# Patient Record
Sex: FEMALE | Race: WHITE | Employment: UNEMPLOYED | URBAN - METROPOLITAN AREA
[De-identification: names, ages, dates, MRNs, and addresses within clinical notes are randomized per-mention and may not be internally consistent; named-entity substitution may affect disease eponyms.]

---

## 2018-07-25 ENCOUNTER — HOSPITAL ENCOUNTER (OUTPATIENT)
Dept: OTHER | Age: 38
Discharge: HOME | End: 2018-07-25
Attending: SURGERY | Admitting: CLINIC/CENTER

## 2018-07-29 LAB
CULTURE: NORMAL
Lab: NORMAL
REPORT STATUS: NORMAL
SPECIMEN: NORMAL

## 2018-10-29 ENCOUNTER — HOSPITAL ENCOUNTER (OUTPATIENT)
Age: 38
Setting detail: SPECIMEN
Discharge: HOME | End: 2018-10-29
Payer: COMMERCIAL

## 2018-10-29 PROCEDURE — 87070 CULTURE OTHR SPECIMN AEROBIC: CPT

## 2018-11-01 LAB
CULTURE: NORMAL
Lab: NORMAL
REPORT STATUS: NORMAL
SPECIMEN: NORMAL

## 2019-02-26 ENCOUNTER — HOSPITAL ENCOUNTER (OUTPATIENT)
Age: 39
Discharge: HOME | End: 2019-02-26
Payer: COMMERCIAL

## 2019-02-26 LAB
ALBUMIN SERPL-MCNC: 4.4 GM/DL (ref 3.4–5)
ALP BLD-CCNC: 63 IU/L (ref 40–129)
ALT SERPL-CCNC: 17 U/L (ref 10–40)
AST SERPL-CCNC: 15 IU/L (ref 15–37)
BILIRUB SERPL-MCNC: 0.2 MG/DL (ref 0–1)
BILIRUBIN DIRECT: 0.2 MG/DL (ref 0–0.3)
BILIRUBIN, INDIRECT: 0 MG/DL (ref 0–0.7)
HEPATITIS B SURFACE ANTIGEN: NON REACTIVE
HEPATITIS C ANTIBODY: NON REACTIVE
TOTAL PROTEIN: 6.8 GM/DL (ref 6.4–8.2)

## 2019-02-26 PROCEDURE — 87389 HIV-1 AG W/HIV-1&-2 AB AG IA: CPT

## 2019-02-26 PROCEDURE — 80076 HEPATIC FUNCTION PANEL: CPT

## 2019-02-26 PROCEDURE — 87340 HEPATITIS B SURFACE AG IA: CPT

## 2019-02-26 PROCEDURE — 86803 HEPATITIS C AB TEST: CPT

## 2019-02-26 PROCEDURE — 36415 COLL VENOUS BLD VENIPUNCTURE: CPT

## 2019-02-28 ENCOUNTER — TELEPHONE (OUTPATIENT)
Dept: GASTROENTEROLOGY | Age: 39
End: 2019-02-28

## 2019-02-28 LAB — HIV SCREEN: NON REACTIVE

## 2020-06-30 ENCOUNTER — HOSPITAL ENCOUNTER (EMERGENCY)
Age: 40
Discharge: LEFT BEFORE DISCHARGE | End: 2020-06-30
Attending: EMERGENCY MEDICINE
Payer: COMMERCIAL

## 2020-06-30 VITALS
DIASTOLIC BLOOD PRESSURE: 84 MMHG | HEIGHT: 71 IN | HEART RATE: 67 BPM | BODY MASS INDEX: 15.4 KG/M2 | WEIGHT: 110 LBS | TEMPERATURE: 98.1 F | OXYGEN SATURATION: 100 % | RESPIRATION RATE: 19 BRPM | SYSTOLIC BLOOD PRESSURE: 110 MMHG

## 2020-06-30 PROCEDURE — 99283 EMERGENCY DEPT VISIT LOW MDM: CPT

## 2020-06-30 ASSESSMENT — PAIN DESCRIPTION - LOCATION: LOCATION: ABDOMEN;MOUTH

## 2020-06-30 ASSESSMENT — PAIN SCALES - GENERAL: PAINLEVEL_OUTOF10: 10

## 2020-06-30 NOTE — ED PROVIDER NOTES
As physician-in-triage, I performed a medical screening history and physical exam on this patient. HISTORY OF PRESENT ILLNESS  Carol Lombardo is a 47 y.o. female states toothache a few days ago, went to dentist and has an abscess. States that they will not pull the tooth. States she is had nausea and vomiting for several days. States she is concerned she is dehydrated. PHYSICAL EXAM  There were no vitals taken for this visit. On exam, the patient appears well-hydrated, well-nourished, and in no acute distress. Mucous membranes are moist. Speech is clear. Breathing is unlabored. Skin is dry. Mental status is normal. The patient moves all extremities, and is without facial droop. Comment: Please note this report has been produced using speech recognition software and may contain errors related to that system including errors in grammar, punctuation, and spelling, as well as words and phrases that may be inappropriate. If there are any questions or concerns please feel free to contact the dictating provider for clarification.       Alvin Pinto,   06/30/20 [id number]

## 2020-06-30 NOTE — ED TRIAGE NOTES
Pt is a recovering addict, sober for 2 years, does not want any narcotic medication. Pt has a dental abscess that is causing severe pain, nausea, and vomiting. Pt states that she is vomiting blood, none noted during triage. Pt states that she needs an IV and that she is dehydrated.

## 2020-07-01 NOTE — ED PROVIDER NOTES
Patient eloped. I did not see or evaluate this patient.     Electronically signed by Robert Montes MD on 6/30/2020 at 9:00 PM        Robert Montes MD  07/03/20 [id number] Adria Noble MD  07/03/20 [id number]

## 2022-10-10 ENCOUNTER — TELEPHONE (OUTPATIENT)
Dept: FAMILY MEDICINE CLINIC | Age: 42
End: 2022-10-10

## 2022-10-10 NOTE — TELEPHONE ENCOUNTER
Made appointment for patient. Mother called in for her to be a new patient. Had been a patient here years ago.

## 2022-10-27 ENCOUNTER — OFFICE VISIT (OUTPATIENT)
Dept: FAMILY MEDICINE CLINIC | Age: 42
End: 2022-10-27
Payer: MEDICARE

## 2022-10-27 ENCOUNTER — TELEPHONE (OUTPATIENT)
Dept: FAMILY MEDICINE CLINIC | Age: 42
End: 2022-10-27

## 2022-10-27 VITALS
HEART RATE: 72 BPM | SYSTOLIC BLOOD PRESSURE: 92 MMHG | HEIGHT: 71 IN | WEIGHT: 97 LBS | BODY MASS INDEX: 13.58 KG/M2 | DIASTOLIC BLOOD PRESSURE: 56 MMHG | TEMPERATURE: 98.1 F | OXYGEN SATURATION: 95 %

## 2022-10-27 DIAGNOSIS — Z00.00 ENCOUNTER FOR MEDICAL EXAMINATION TO ESTABLISH CARE: ICD-10-CM

## 2022-10-27 DIAGNOSIS — J30.89 ALLERGIC RHINITIS DUE TO FUNGAL SPORES, UNSPECIFIED SEASONALITY: ICD-10-CM

## 2022-10-27 DIAGNOSIS — K21.9 GASTROESOPHAGEAL REFLUX DISEASE, UNSPECIFIED WHETHER ESOPHAGITIS PRESENT: ICD-10-CM

## 2022-10-27 DIAGNOSIS — Z00.00 ENCOUNTER FOR MEDICAL EXAMINATION TO ESTABLISH CARE: Primary | ICD-10-CM

## 2022-10-27 DIAGNOSIS — R63.6 UNDERWEIGHT: ICD-10-CM

## 2022-10-27 DIAGNOSIS — F17.210 CIGARETTE SMOKER: ICD-10-CM

## 2022-10-27 DIAGNOSIS — F41.9 ANXIETY DISORDER, UNSPECIFIED TYPE: ICD-10-CM

## 2022-10-27 DIAGNOSIS — Z13.220 SCREENING CHOLESTEROL LEVEL: ICD-10-CM

## 2022-10-27 DIAGNOSIS — J01.40 ACUTE PANSINUSITIS, RECURRENCE NOT SPECIFIED: ICD-10-CM

## 2022-10-27 DIAGNOSIS — K59.00 CONSTIPATION, UNSPECIFIED CONSTIPATION TYPE: ICD-10-CM

## 2022-10-27 DIAGNOSIS — R68.81 EARLY SATIETY: ICD-10-CM

## 2022-10-27 PROBLEM — F32.A ANXIETY AND DEPRESSION: Status: ACTIVE | Noted: 2022-10-27

## 2022-10-27 PROCEDURE — 99205 OFFICE O/P NEW HI 60 MIN: CPT

## 2022-10-27 RX ORDER — ALBUTEROL SULFATE 90 UG/1
2 AEROSOL, METERED RESPIRATORY (INHALATION) 4 TIMES DAILY PRN
Qty: 54 G | Refills: 1 | Status: SHIPPED | OUTPATIENT
Start: 2022-10-27

## 2022-10-27 RX ORDER — BUPRENORPHINE HYDROCHLORIDE 8 MG/1
TABLET SUBLINGUAL
COMMUNITY
Start: 2022-10-21

## 2022-10-27 RX ORDER — PANTOPRAZOLE SODIUM 40 MG/1
40 TABLET, DELAYED RELEASE ORAL
Qty: 30 TABLET | Refills: 5 | Status: SHIPPED
Start: 2022-10-27 | End: 2022-11-23 | Stop reason: SINTOL

## 2022-10-27 RX ORDER — DOXYCYCLINE HYCLATE 100 MG
100 TABLET ORAL 2 TIMES DAILY
Qty: 20 TABLET | Refills: 0 | Status: SHIPPED | OUTPATIENT
Start: 2022-10-27 | End: 2022-11-06

## 2022-10-27 RX ORDER — LORATADINE 10 MG/1
10 TABLET ORAL DAILY
Qty: 30 TABLET | Refills: 3 | Status: SHIPPED | OUTPATIENT
Start: 2022-10-27 | End: 2022-11-23 | Stop reason: SDUPTHER

## 2022-10-27 RX ORDER — METHYLPREDNISOLONE 4 MG/1
TABLET ORAL
Qty: 1 KIT | Refills: 0 | Status: SHIPPED | OUTPATIENT
Start: 2022-10-27 | End: 2022-11-03 | Stop reason: SDUPTHER

## 2022-10-27 RX ORDER — FLUTICASONE PROPIONATE 50 MCG
2 SPRAY, SUSPENSION (ML) NASAL DAILY
Qty: 16 G | Refills: 0 | Status: SHIPPED | OUTPATIENT
Start: 2022-10-27 | End: 2022-11-23 | Stop reason: SDUPTHER

## 2022-10-27 SDOH — ECONOMIC STABILITY: FOOD INSECURITY: WITHIN THE PAST 12 MONTHS, YOU WORRIED THAT YOUR FOOD WOULD RUN OUT BEFORE YOU GOT MONEY TO BUY MORE.: NEVER TRUE

## 2022-10-27 SDOH — ECONOMIC STABILITY: FOOD INSECURITY: WITHIN THE PAST 12 MONTHS, THE FOOD YOU BOUGHT JUST DIDN'T LAST AND YOU DIDN'T HAVE MONEY TO GET MORE.: NEVER TRUE

## 2022-10-27 ASSESSMENT — ENCOUNTER SYMPTOMS
NAUSEA: 0
ABDOMINAL PAIN: 1
SINUS PAIN: 1
COUGH: 1
ABDOMINAL DISTENTION: 0
DIARRHEA: 0
SINUS PRESSURE: 1
RHINORRHEA: 1
SHORTNESS OF BREATH: 1
COLOR CHANGE: 0
VOMITING: 0
BLOOD IN STOOL: 0
WHEEZING: 0
CONSTIPATION: 0

## 2022-10-27 ASSESSMENT — PATIENT HEALTH QUESTIONNAIRE - PHQ9
SUM OF ALL RESPONSES TO PHQ9 QUESTIONS 1 & 2: 3
7. TROUBLE CONCENTRATING ON THINGS, SUCH AS READING THE NEWSPAPER OR WATCHING TELEVISION: 3
5. POOR APPETITE OR OVEREATING: 3
8. MOVING OR SPEAKING SO SLOWLY THAT OTHER PEOPLE COULD HAVE NOTICED. OR THE OPPOSITE, BEING SO FIGETY OR RESTLESS THAT YOU HAVE BEEN MOVING AROUND A LOT MORE THAN USUAL: 0
6. FEELING BAD ABOUT YOURSELF - OR THAT YOU ARE A FAILURE OR HAVE LET YOURSELF OR YOUR FAMILY DOWN: 3
9. THOUGHTS THAT YOU WOULD BE BETTER OFF DEAD, OR OF HURTING YOURSELF: 0
3. TROUBLE FALLING OR STAYING ASLEEP: 3
1. LITTLE INTEREST OR PLEASURE IN DOING THINGS: 0
4. FEELING TIRED OR HAVING LITTLE ENERGY: 3
SUM OF ALL RESPONSES TO PHQ QUESTIONS 1-9: 18
SUM OF ALL RESPONSES TO PHQ QUESTIONS 1-9: 18
2. FEELING DOWN, DEPRESSED OR HOPELESS: 3
SUM OF ALL RESPONSES TO PHQ QUESTIONS 1-9: 18
SUM OF ALL RESPONSES TO PHQ QUESTIONS 1-9: 18
10. IF YOU CHECKED OFF ANY PROBLEMS, HOW DIFFICULT HAVE THESE PROBLEMS MADE IT FOR YOU TO DO YOUR WORK, TAKE CARE OF THINGS AT HOME, OR GET ALONG WITH OTHER PEOPLE: 3

## 2022-10-27 ASSESSMENT — SOCIAL DETERMINANTS OF HEALTH (SDOH): HOW HARD IS IT FOR YOU TO PAY FOR THE VERY BASICS LIKE FOOD, HOUSING, MEDICAL CARE, AND HEATING?: SOMEWHAT HARD

## 2022-10-27 NOTE — PROGRESS NOTES
10/27/2022    [address] [place] [address]    Chief Complaint   Patient presents with    New Patient     - sinus pain & pressure, nasal congestion, left ear pain, voice hoarse, productive cough, chest congestion, chest hurts. 3 - 4 months. Was seen [facility]. Pt bought a house 2 years ago - concern their might be mold. Has not seen mold but is scheduled to have someone come and test for mold. Pt has noticed she feels better when she leaves the house within 1 hour post  coming home she feels as if she has a headache, difficulty breathing. Pt was unable to take azithromycin d/t nausea & vom       HPI  History was obtained from patient. Tru Aguilera is a pleasant 38 y.o. female who presents today to establish care. She has not had a primary care provider in several years. She has three biological and one adopted child. Her biological children are also sick but her adopted son is not. Her biological children have had various viral infections within the last month. The roof of a home that she bought to fix up and move into was worked on and they found some mold- she has not had any additional mold found but she has not opened walls- she is working with a [employer] for possible inspection and remediation- she has gotten approval for roof replacement and mold inspection/remediation but they have to send someone out and she has not been notified of when that will take place. She is not currently living in the home- they are working on remodeling. Currently she lives with her mother who is looking to downsize and move herself in the coming months. Since beginning to work on this home she has noticed headaches that started in June. She has roughly 4 headaches a week at this point. Headaches always originate from her frontal sinus area and then slowly spread over the rest of her head. She denies vision changes/hearing changes during these headaches.  She notes increased sinus pain and pressure, nasal congestion and left ear pain. She has SOB that is worse with exertion, she is a current smoker. She is losing her hair and has a very difficult time sleeping. Sinus symptoms have been present since July- she was previously seen for this but was unable to tolerate Azithromycin. Several negative Covid-19 tests. Her sister passed away unexpectedly in May of this year. She endorses difficulty dealing with this and her addiction specialist has tried several medications to both help with weight gain as well as anxiety/depression without much success. She follows with Dr. Rhea Mullen for suboxone. Has been clean and sober for over 4 years. She is underweight and struggles to finish her meals. She states that she wants to eat and when she sits down to eat that snf through the meal she begins to feel sick and is not even able to swallow another bite of food. She denies difficulty swallowing in general.  She does struggle with constipation but takes MiraLAX on a regular basis. Endorses type I Joshua stool chart BMs. Denies blood in stool/black stools. States that she has a history of gastroparesis. She eats usually one larger meal per day- she has tried to purchase Boost/Ensure but this is expensive for her. 1. Encounter for medical examination to establish care    2. Acute pansinusitis, recurrence not specified    3. Allergic rhinitis due to fungal spores, unspecified seasonality    4. Anxiety and depression    5. Underweight    6. Early satiety    7. Cigarette smoker    8. Screening cholesterol level    9. Constipation, unspecified constipation type    10. Anxiety disorder, unspecified type              REVIEW OF SYMPTOMS    Review of Systems   Constitutional:  Positive for fatigue. Negative for activity change, appetite change, chills, fever and unexpected weight change. HENT:  Positive for postnasal drip, rhinorrhea, sinus pressure and sinus pain.     Respiratory:  Positive for cough and shortness of breath. Negative for wheezing. Cardiovascular:  Negative for chest pain and palpitations. Gastrointestinal:  Positive for abdominal pain. Negative for abdominal distention, blood in stool, constipation, diarrhea, nausea and vomiting. Skin:  Negative for color change. Neurological:  Negative for syncope and headaches. PAST MEDICAL HISTORY  Past Medical History:   Diagnosis Date    Anxiety     Arrhythmia     AV block, 2nd degree     mobitz type 1    Back injury     Bradycardia     Depression     Gastroparesis     GERD (gastroesophageal reflux disease)     H/O 24 hour EKG monitoring 6/11    baseline rhythm is sinus amina  average 50bpm , episodes of wenchebach phenomenon with brief runs of sinus tachycardia. H/O complete electrocardiogram 6/23/11         Hx of echocardiogram 6/23/11    normal size lv trace tricuspid regurg otherwise normal .    Scoliosis     SOB (shortness of breath)        FAMILY HISTORY  Family History   Problem Relation Age of Onset    Stroke Mother     High Cholesterol Mother     Mult Sclerosis Mother     Crohn's Disease Sister     Bleeding Prob Brother        SOCIAL HISTORY  Social History     Socioeconomic History    Marital status:      Spouse name: None    Number of children: None    Years of education: None    Highest education level: None   Tobacco Use    Smoking status: Every Day     Packs/day: 0.50     Years: 30.00     Pack years: 15.00     Types: Cigarettes     Start date: 1992    Smokeless tobacco: Never    Tobacco comments:     counseled to quit smoking. 2/5/2013   Substance and Sexual Activity    Alcohol use: No    Drug use: Yes     Types: Marijuana Crispin Mccartney)     Comment: history addiction percocet    Sexual activity: Yes     Partners: Male     Social Determinants of Health     Financial Resource Strain: Medium Risk    Difficulty of Paying Living Expenses: Somewhat hard   Food Insecurity: No Food Insecurity    Worried About Running Out of Food in the Last Year: Never true    Ran Out of Food in the Last Year: Never true        SURGICAL HISTORY  Past Surgical History:   Procedure Laterality Date    APPENDECTOMY      BACK SURGERY      scoliosis    BREAST LUMPECTOMY      R breast    CHOLECYSTECTOMY      HYSTERECTOMY (CERVIX STATUS UNKNOWN)  11/10     OVARY REMOVAL  11/10    TONSILLECTOMY      TUBAL LIGATION                   CURRENT MEDICATIONS  Current Outpatient Medications   Medication Sig Dispense Refill    buprenorphine (SUBUTEX) 8 MG SUBL SL tablet       doxycycline hyclate (VIBRA-TABS) 100 MG tablet Take 1 tablet by mouth 2 times daily for 10 days 20 tablet 0    methylPREDNISolone (MEDROL DOSEPACK) 4 MG tablet Take by mouth. 1 kit 0    albuterol sulfate HFA (VENTOLIN HFA) 108 (90 Base) MCG/ACT inhaler Inhale 2 puffs into the lungs 4 times daily as needed for Wheezing 54 g 1    loratadine (CLARITIN) 10 MG tablet Take 1 tablet by mouth daily 30 tablet 3    fluticasone (FLONASE) 50 MCG/ACT nasal spray 2 sprays by Each Nostril route daily 16 g 0    pantoprazole (PROTONIX) 40 MG tablet Take 1 tablet by mouth every morning (before breakfast) 30 tablet 5    dicyclomine (BENTYL) 10 MG capsule Take 1 capsule by mouth 3 times daily As needed for abdominal pain 15 capsule 0    ALBUTEROL SULFATE HFA IN Inhale into the lungs       No current facility-administered medications for this visit. ALLERGIES  Allergies   Allergen Reactions    Amoxicillin Hives and Shortness Of Breath    Pcn [Penicillins] Hives and Shortness Of Breath    Sulfa Antibiotics     Meloxicam Nausea And Vomiting       PHYSICAL EXAM    BP (!) 92/56   Pulse 72   Temp 98.1 °F (36.7 °C)   Ht 5' 11\" (1.803 m)   Wt 97 lb (44 kg)   SpO2 95%   BMI 13.53 kg/m²     Physical Exam  Vitals and nursing note reviewed. Constitutional:       General: She is not in acute distress. Appearance: Normal appearance. She is well-developed. HENT:      Head: Normocephalic and atraumatic.       Nose:      Right Sinus: Frontal sinus tenderness present. Left Sinus: Frontal sinus tenderness present. Eyes:      General: No scleral icterus. Conjunctiva/sclera: Conjunctivae normal.   Neck:      Thyroid: No thyromegaly. Trachea: No tracheal deviation. Cardiovascular:      Rate and Rhythm: Normal rate and regular rhythm. Heart sounds: Normal heart sounds. No murmur heard. No friction rub. No gallop. Pulmonary:      Effort: Pulmonary effort is normal. No respiratory distress. Breath sounds: Normal breath sounds. No wheezing or rales. Abdominal:      General: Bowel sounds are normal. There is no distension. Palpations: Abdomen is soft. There is no hepatomegaly or mass. Tenderness: There is no abdominal tenderness. There is no guarding or rebound. Musculoskeletal:         General: No swelling or deformity. Normal range of motion. Cervical back: Neck supple. Lymphadenopathy:      Cervical: No cervical adenopathy. Skin:     General: Skin is warm and dry. Nails: There is no clubbing. Neurological:      Mental Status: She is alert and oriented to person, place, and time. Mental status is at baseline. Psychiatric:         Mood and Affect: Mood normal.         Behavior: Behavior normal.         Thought Content: Thought content normal.         Judgment: Judgment normal.       ASSESSMENT & PLAN    Herminio Arthur was seen today for new patient. Diagnoses and all orders for this visit:    Encounter for medical examination to establish care  -     TSH with Reflex to FT4; Future  -     Comprehensive Metabolic Panel; Future  -     CBC with Auto Differential; Future  -     Ambulatory referral to Obstetrics / Gynecology    Acute pansinusitis, recurrence not specified  Symptoms consistent with sinusitis, will treat and consider ENT referral if symptoms persist- as her drug abuse history was inhalation and not IV in nature.  Will also provide steroid related to history of smoking.   - doxycycline hyclate (VIBRA-TABS) 100 MG tablet; Take 1 tablet by mouth 2 times daily for 10 days  -     methylPREDNISolone (MEDROL DOSEPACK) 4 MG tablet; Take by mouth. Allergic rhinitis due to fungal spores, unspecified seasonality  Symptoms increase when she goes to the new home to work- she is wearing a mask for all work there. Will treat for allergic response.   -     loratadine (CLARITIN) 10 MG tablet; Take 1 tablet by mouth daily  -     fluticasone (FLONASE) 50 MCG/ACT nasal spray; 2 sprays by Each Nostril route daily    Underweight  Will check for malabsorption, thyroid function, anemia. Will refer to care coordination for possible insurance coverage of meal supplement shakes. Reaching out to Dr. Kaya Ortiz for list of previous medications that he prescribed to pt for weight gain to determine which choice would be best to try next.   -     TSH with Reflex to FT4; Future  -     Comprehensive Metabolic Panel; Future  -     CBC with Auto Differential; Future  -     Vitamin B12 & Folate; Future  -     Magnesium; Future    Early satiety  Unable to finish an entire meal, considerably underweight. -     Kortney Irving CNP, Gastroenterology, [facility]    Cigarette smoker  Increased SOB with work in the new home- possible allergies versus chronic changes from smoking history. Will trial inhaler and assess efficacy. -     albuterol sulfate HFA (VENTOLIN HFA) 108 (90 Base) MCG/ACT inhaler; Inhale 2 puffs into the lungs 4 times daily as needed for Wheezing    Screening cholesterol level  Will update labs and ASCVD risk score. -     Lipid, Fasting; Future    Constipation, unspecified constipation type  Taking Miralax daily but still having constipation- could be a factor in weight loss.    -     Kortney Irving CNP, Gastroenterology, [facility]    Anxiety disorder, unspecified type   -     TSH with Reflex to FT4; Future    Gastroesophageal reflux disease, unspecified whether esophagitis present        -     pantoprazole (PROTONIX) 40 MG tablet; Take 1 tablet by mouth every morning (before breakfast)    Return in about 4 weeks (around 11/24/2022). On this date 10/27/22 I have spent 68 minutes reviewing previous notes, test results and face to face with the patient discussing the diagnosis and importance of compliance with the treatment plan as well as documenting on the day of the visit.     Electronically signed by CAROLE Beckwith CNP on 10/27/2022

## 2022-10-27 NOTE — TELEPHONE ENCOUNTER
Spoke with Valerie with Dr Mary Anne Ivy office  [phone] requesting record of meds tried for appetite , depression and anxiety.  Valerie stated she will send info

## 2022-10-27 NOTE — PATIENT INSTRUCTIONS
[facility] Obstetrics and Gynecology - Yang DiannChaka harper  Adamaris Schulte [address]  Phone: [phone] Gastroenterology - Mary Sp[address], [address] [facility]  Sof[address] [phone]    Care Coordinator Miya Denis RN

## 2022-10-28 ENCOUNTER — TELEPHONE (OUTPATIENT)
Dept: GASTROENTEROLOGY | Age: 42
End: 2022-10-28

## 2022-10-28 LAB
A/G RATIO: 1.8 (ref 1.1–2.2)
ALBUMIN SERPL-MCNC: 4.6 G/DL (ref 3.4–5)
ALP BLD-CCNC: 73 U/L (ref 40–129)
ALT SERPL-CCNC: 15 U/L (ref 10–40)
ANION GAP SERPL CALCULATED.3IONS-SCNC: 10 MMOL/L (ref 3–16)
AST SERPL-CCNC: 16 U/L (ref 15–37)
BASOPHILS ABSOLUTE: 0.1 K/UL (ref 0–0.2)
BASOPHILS RELATIVE PERCENT: 1.2 %
BILIRUB SERPL-MCNC: <0.2 MG/DL (ref 0–1)
BUN BLDV-MCNC: 9 MG/DL (ref 7–20)
CALCIUM SERPL-MCNC: 9.7 MG/DL (ref 8.3–10.6)
CHLORIDE BLD-SCNC: 100 MMOL/L (ref 99–110)
CHOLESTEROL, FASTING: 159 MG/DL (ref 0–199)
CO2: 30 MMOL/L (ref 21–32)
CREAT SERPL-MCNC: 0.6 MG/DL (ref 0.6–1.1)
EOSINOPHILS ABSOLUTE: 0.3 K/UL (ref 0–0.6)
EOSINOPHILS RELATIVE PERCENT: 7.3 %
FOLATE: 6.15 NG/ML (ref 4.78–24.2)
GFR SERPL CREATININE-BSD FRML MDRD: >60 ML/MIN/{1.73_M2}
GLUCOSE BLD-MCNC: 90 MG/DL (ref 70–99)
HCT VFR BLD CALC: 38.5 % (ref 36–48)
HDLC SERPL-MCNC: 45 MG/DL (ref 40–60)
HEMOGLOBIN: 13.5 G/DL (ref 12–16)
LDL CHOLESTEROL CALCULATED: 97 MG/DL
LYMPHOCYTES ABSOLUTE: 1.1 K/UL (ref 1–5.1)
LYMPHOCYTES RELATIVE PERCENT: 27.5 %
MAGNESIUM: 2.1 MG/DL (ref 1.8–2.4)
MCH RBC QN AUTO: 31.9 PG (ref 26–34)
MCHC RBC AUTO-ENTMCNC: 35 G/DL (ref 31–36)
MCV RBC AUTO: 91.3 FL (ref 80–100)
MONOCYTES ABSOLUTE: 0.7 K/UL (ref 0–1.3)
MONOCYTES RELATIVE PERCENT: 18.4 %
NEUTROPHILS ABSOLUTE: 1.8 K/UL (ref 1.7–7.7)
NEUTROPHILS RELATIVE PERCENT: 45.6 %
PDW BLD-RTO: 12.8 % (ref 12.4–15.4)
PLATELET # BLD: 217 K/UL (ref 135–450)
PMV BLD AUTO: 9.5 FL (ref 5–10.5)
POTASSIUM SERPL-SCNC: 4 MMOL/L (ref 3.5–5.1)
RBC # BLD: 4.22 M/UL (ref 4–5.2)
SODIUM BLD-SCNC: 140 MMOL/L (ref 136–145)
TOTAL PROTEIN: 7.1 G/DL (ref 6.4–8.2)
TRIGLYCERIDE, FASTING: 85 MG/DL (ref 0–150)
TSH REFLEX FT4: 0.83 UIU/ML (ref 0.27–4.2)
VITAMIN B-12: 1038 PG/ML (ref 211–911)
VLDLC SERPL CALC-MCNC: 17 MG/DL
WBC # BLD: 4 K/UL (ref 4–11)

## 2022-10-31 ENCOUNTER — TELEPHONE (OUTPATIENT)
Dept: FAMILY MEDICINE CLINIC | Age: 42
End: 2022-10-31

## 2022-10-31 NOTE — TELEPHONE ENCOUNTER
Fyi also to [facility]. Spoke with pt per provider note. Pt agreed & voiced understanding. Pt states she is feeling better still waiting to hear from gi. Agrees to start abilify. Informed pt to call us if she is not improving. Pt agreed and voiced understanding.

## 2022-10-31 NOTE — TELEPHONE ENCOUNTER
Dr Ana Tamayo requested that you review her 10/27/22 office note & Dr Jeramie Farooq (addiction specialist) office (his note is in media). To see which meds would be appropriate to start for anxiety & depression. Dr Claudean Shield did recently rx Abilify 2 mg 1 qd. Pt did not start med yet d/t waiting to hear what our office suggests. Informed pt to please give you a couple days to answer this message.     Thanks

## 2022-10-31 NOTE — TELEPHONE ENCOUNTER
Spoke with Valerie again with Dr Isidro Young office  [phone]. Informed did not receive faxed info re past depression & anxiety meds pt has tried. Valerie stated she will refax to our office.

## 2022-11-03 ENCOUNTER — TELEPHONE (OUTPATIENT)
Dept: FAMILY MEDICINE CLINIC | Age: 42
End: 2022-11-03

## 2022-11-03 DIAGNOSIS — J01.40 ACUTE PANSINUSITIS, RECURRENCE NOT SPECIFIED: ICD-10-CM

## 2022-11-03 RX ORDER — METHYLPREDNISOLONE 4 MG/1
TABLET ORAL
Qty: 1 KIT | Refills: 0 | Status: SHIPPED | OUTPATIENT
Start: 2022-11-03 | End: 2022-11-23 | Stop reason: ALTCHOICE

## 2022-11-04 ENCOUNTER — CARE COORDINATION (OUTPATIENT)
Dept: CARE COORDINATION | Age: 42
End: 2022-11-04

## 2022-11-04 ENCOUNTER — TELEPHONE (OUTPATIENT)
Dept: GASTROENTEROLOGY | Age: 42
End: 2022-11-04

## 2022-11-04 NOTE — TELEPHONE ENCOUNTER
Called pt. In regards to a referral for a constipation and early satiety. Lm for pt to call back to make an appt.

## 2022-11-10 ENCOUNTER — CARE COORDINATION (OUTPATIENT)
Dept: CARE COORDINATION | Age: 42
End: 2022-11-10

## 2022-11-11 ENCOUNTER — TELEPHONE (OUTPATIENT)
Dept: GASTROENTEROLOGY | Age: 42
End: 2022-11-11

## 2022-11-11 NOTE — TELEPHONE ENCOUNTER
Attempted to call patient to schedule an appointment. Unable to reach patient. Left a detailed message requesting a call back.

## 2022-11-14 RX ORDER — ALBUTEROL SULFATE 2.5 MG/3ML
2.5 SOLUTION RESPIRATORY (INHALATION) EVERY 6 HOURS PRN
Qty: 120 EACH | Refills: 0 | Status: SHIPPED | OUTPATIENT
Start: 2022-11-14

## 2022-11-21 ENCOUNTER — CARE COORDINATION (OUTPATIENT)
Dept: CARE COORDINATION | Age: 42
End: 2022-11-21

## 2022-11-23 ENCOUNTER — OFFICE VISIT (OUTPATIENT)
Dept: FAMILY MEDICINE CLINIC | Age: 42
End: 2022-11-23
Payer: MEDICARE

## 2022-11-23 VITALS
WEIGHT: 97.4 LBS | RESPIRATION RATE: 16 BRPM | SYSTOLIC BLOOD PRESSURE: 94 MMHG | DIASTOLIC BLOOD PRESSURE: 60 MMHG | HEIGHT: 71 IN | BODY MASS INDEX: 13.64 KG/M2 | HEART RATE: 85 BPM | OXYGEN SATURATION: 94 %

## 2022-11-23 DIAGNOSIS — J30.89 ALLERGIC RHINITIS DUE TO FUNGAL SPORES, UNSPECIFIED SEASONALITY: ICD-10-CM

## 2022-11-23 DIAGNOSIS — R63.6 UNDERWEIGHT: ICD-10-CM

## 2022-11-23 DIAGNOSIS — K21.9 GASTROESOPHAGEAL REFLUX DISEASE, UNSPECIFIED WHETHER ESOPHAGITIS PRESENT: ICD-10-CM

## 2022-11-23 DIAGNOSIS — F41.9 ANXIETY AND DEPRESSION: Primary | ICD-10-CM

## 2022-11-23 DIAGNOSIS — F32.A ANXIETY AND DEPRESSION: Primary | ICD-10-CM

## 2022-11-23 PROCEDURE — 99214 OFFICE O/P EST MOD 30 MIN: CPT

## 2022-11-23 RX ORDER — MEGESTROL ACETATE 40 MG/1
40 TABLET ORAL DAILY
Qty: 30 TABLET | Refills: 3 | Status: SHIPPED | OUTPATIENT
Start: 2022-11-23

## 2022-11-23 RX ORDER — DULOXETIN HYDROCHLORIDE 20 MG/1
20 CAPSULE, DELAYED RELEASE ORAL DAILY
Qty: 30 CAPSULE | Refills: 3 | Status: SHIPPED | OUTPATIENT
Start: 2022-11-23

## 2022-11-23 RX ORDER — LORATADINE 10 MG/1
10 TABLET ORAL DAILY
Qty: 30 TABLET | Refills: 3 | Status: SHIPPED | OUTPATIENT
Start: 2022-11-23

## 2022-11-23 RX ORDER — OMEPRAZOLE 20 MG/1
20 CAPSULE, DELAYED RELEASE ORAL
Qty: 60 CAPSULE | Refills: 2 | Status: SHIPPED | OUTPATIENT
Start: 2022-11-23

## 2022-11-23 RX ORDER — FLUTICASONE PROPIONATE 50 MCG
2 SPRAY, SUSPENSION (ML) NASAL DAILY
Qty: 16 G | Refills: 0 | Status: SHIPPED | OUTPATIENT
Start: 2022-11-23

## 2022-11-23 RX ORDER — ARIPIPRAZOLE 5 MG/1
5 TABLET ORAL DAILY
COMMUNITY

## 2022-11-23 NOTE — Clinical Note
I spoke with Isael Winston as you have tried to reach out- she apologized- there has been some trouble with her cell and she admits that she has not been great at checking her vm. She asked to have you reach out again in an effort to get her nutritional supplement covered by her insurance.

## 2022-11-23 NOTE — PATIENT INSTRUCTIONS
[address] [facility] [address]  [address] [place] [address] Adamaris Mcfadden, [address]  [phone]  [employer]    [address]  Adamaris Schulte, [address]  Phone: [phone]   Hours: 8am-6pm   www. [email or web address]  [address]  Phone: [phone]  Hours: M, Tues 9am-6pm, W, Thurs 9am-8pm, F 10am-2pm  [email or web address] for [facility] and [address] [facility] [address], [address] [facility]  Phone: [phone]  Hours: M-Thurs 8am-7pm, F 8am-3pm  www. [address] [place] [address] [address], [address] [facility]  Phone: [phone]  Hours: M-F 8:30am -5pm  [email or web address]    [facility] [address] [facility] [address]  Phone: [phone]  [facility] Hotline: [phone]  Hours: M-F 8am-5pm (Hotline 24/7)  [email or web address]    [facility] [employer] [place] [facility])   [address]   [place] [address]  Phone: [phone]  www. [email or web address]    [facility]   [address] [place] [address]  [phone]  [email or web address]. [email or web address]
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
## 2022-11-24 ASSESSMENT — ENCOUNTER SYMPTOMS
ABDOMINAL PAIN: 1
DIARRHEA: 0
NAUSEA: 0
WHEEZING: 0
ABDOMINAL DISTENTION: 0
BLOOD IN STOOL: 0
CONSTIPATION: 0
VOMITING: 0
COUGH: 1
SHORTNESS OF BREATH: 1
COLOR CHANGE: 0

## 2022-11-25 ENCOUNTER — CARE COORDINATION (OUTPATIENT)
Dept: CARE COORDINATION | Age: 42
End: 2022-11-25

## 2022-11-25 SDOH — HEALTH STABILITY: MENTAL HEALTH: HOW OFTEN DO YOU HAVE A DRINK CONTAINING ALCOHOL?: NEVER

## 2022-11-25 SDOH — SOCIAL STABILITY: SOCIAL NETWORK: HOW OFTEN DO YOU GET TOGETHER WITH FRIENDS OR RELATIVES?: TWICE A WEEK

## 2022-11-25 SDOH — HEALTH STABILITY: MENTAL HEALTH
STRESS IS WHEN SOMEONE FEELS TENSE, NERVOUS, ANXIOUS, OR CAN'T SLEEP AT NIGHT BECAUSE THEIR MIND IS TROUBLED. HOW STRESSED ARE YOU?: TO SOME EXTENT

## 2022-11-25 SDOH — ECONOMIC STABILITY: TRANSPORTATION INSECURITY
IN THE PAST 12 MONTHS, HAS THE LACK OF TRANSPORTATION KEPT YOU FROM MEDICAL APPOINTMENTS OR FROM GETTING MEDICATIONS?: NO

## 2022-11-25 SDOH — SOCIAL STABILITY: SOCIAL NETWORK: HOW OFTEN DO YOU ATTEND CHURCH OR RELIGIOUS SERVICES?: 1 TO 4 TIMES PER YEAR

## 2022-11-25 SDOH — ECONOMIC STABILITY: FOOD INSECURITY: WITHIN THE PAST 12 MONTHS, THE FOOD YOU BOUGHT JUST DIDN'T LAST AND YOU DIDN'T HAVE MONEY TO GET MORE.: SOMETIMES TRUE

## 2022-11-25 SDOH — SOCIAL STABILITY: SOCIAL NETWORK
DO YOU BELONG TO ANY CLUBS OR ORGANIZATIONS SUCH AS CHURCH GROUPS UNIONS, FRATERNAL OR ATHLETIC GROUPS, OR SCHOOL GROUPS?: YES

## 2022-11-25 SDOH — SOCIAL STABILITY: SOCIAL NETWORK: ARE YOU MARRIED, WIDOWED, DIVORCED, SEPARATED, NEVER MARRIED, OR LIVING WITH A PARTNER?: DIVORCED

## 2022-11-25 SDOH — HEALTH STABILITY: PHYSICAL HEALTH: ON AVERAGE, HOW MANY DAYS PER WEEK DO YOU ENGAGE IN MODERATE TO STRENUOUS EXERCISE (LIKE A BRISK WALK)?: 2 DAYS

## 2022-11-25 SDOH — ECONOMIC STABILITY: INCOME INSECURITY: HOW HARD IS IT FOR YOU TO PAY FOR THE VERY BASICS LIKE FOOD, HOUSING, MEDICAL CARE, AND HEATING?: SOMEWHAT HARD

## 2022-11-25 SDOH — ECONOMIC STABILITY: TRANSPORTATION INSECURITY
IN THE PAST 12 MONTHS, HAS LACK OF TRANSPORTATION KEPT YOU FROM MEETINGS, WORK, OR FROM GETTING THINGS NEEDED FOR DAILY LIVING?: NO

## 2022-11-25 SDOH — ECONOMIC STABILITY: HOUSING INSECURITY
IN THE LAST 12 MONTHS, WAS THERE A TIME WHEN YOU DID NOT HAVE A STEADY PLACE TO SLEEP OR SLEPT IN A SHELTER (INCLUDING NOW)?: NO

## 2022-11-25 SDOH — SOCIAL STABILITY: SOCIAL NETWORK: HOW OFTEN DO YOU ATTENT MEETINGS OF THE CLUB OR ORGANIZATION YOU BELONG TO?: 1 TO 4 TIMES PER YEAR

## 2022-11-25 SDOH — ECONOMIC STABILITY: FOOD INSECURITY: WITHIN THE PAST 12 MONTHS, YOU WORRIED THAT YOUR FOOD WOULD RUN OUT BEFORE YOU GOT MONEY TO BUY MORE.: SOMETIMES TRUE

## 2022-11-25 SDOH — HEALTH STABILITY: PHYSICAL HEALTH: ON AVERAGE, HOW MANY MINUTES DO YOU ENGAGE IN EXERCISE AT THIS LEVEL?: 10 MIN

## 2022-11-25 SDOH — SOCIAL STABILITY: SOCIAL NETWORK: IN A TYPICAL WEEK, HOW MANY TIMES DO YOU TALK ON THE PHONE WITH FAMILY, FRIENDS, OR NEIGHBORS?: TWICE A WEEK

## 2022-11-25 SDOH — ECONOMIC STABILITY: INCOME INSECURITY: IN THE LAST 12 MONTHS, WAS THERE A TIME WHEN YOU WERE NOT ABLE TO PAY THE MORTGAGE OR RENT ON TIME?: NO

## 2022-11-25 SDOH — HEALTH STABILITY: MENTAL HEALTH: HOW MANY STANDARD DRINKS CONTAINING ALCOHOL DO YOU HAVE ON A TYPICAL DAY?: PATIENT DOES NOT DRINK

## 2022-11-25 SDOH — ECONOMIC STABILITY: HOUSING INSECURITY: IN THE LAST 12 MONTHS, HOW MANY PLACES HAVE YOU LIVED?: 1

## 2022-11-25 NOTE — CARE COORDINATION
Ambulatory Care Coordination Note  11/25/2022    ACC: Luz Maria Dietz, RN      Spoke with patient for ACM follow up:  Provider referral for enrollment to assist with the cost of dietary supplement. Oriented to the role of ACM. Patient consents to ongoing follow up. Patient reports that she is doing ok. Patient reports that she is struggling to attain healthy weight. Reports that she is drinking Boost OTC supplement 3 x day and a children's energy drink. Patient reports that she has multiple stomach issues d/t past history of addict. Current BM! 12.58. Patient reports eating one meal per day. Patient is in recovery and is very engaged to stay on track. Patient reports that she is struggling to cover the cost of her supplements and is seeking insurance support. ACM to provide assistance. Encouraged small frequent meals. Patient reports that she eats one meal at night. Noted that patient was recently started on Megace. Instructed on medication to increase appetite. Discussed RD referral for education to support healthy weight. Medications:  medication reconciliation completed. Patient reports that she manages her own medications and is taking them as directed. Instructed on the importance of compliance with medications as directed. Patient denies any issue with the cost of medications. Reports that she receives an OTC benefit from her insurance Provider. Discussed support/ resource needs:   Patient lives with her four sons. Patient is able to drive. Patient has PIP to help with utilities. Does run out of groceries at times and is interested in support to help supplement groceries. Agreeable to referral for LSW support. Smoking cessation:  Patient is a current every day smoker and reports that she smokes marijuana at night to promote appetite. Instructed on smoking cessation; [facility] support.   Patient reports that she has discussed smoking cessation with Provider and plans to address \" once she gets her weight on track\". Patient reports a history of anxiety. Reports that she was recently started on Abilify and reports that it is \" really helping\". Instructed on counseling/ BH support an patient reports that she has dicussed with Provider. Verbalized her plan to consider this resource if medication alone is not successful. Offered patient enrollment in the Remote Patient Monitoring (RPM) program for in-home monitoring: NA.    Patient denies any questions or concerns at this time. ACM contact information provided should needs arise. Plan for next outreach:  Confirm support follow up. Referral made to RD with request for education/ support. Referral made to LSW to address support needs. Message sent to [employer]/ RONDA CM in r/t dietary supplement coverage. Ambulatory Care Coordination Assessment    Care Coordination Protocol  Referral from Primary Care Provider: Yes  Week 1 - Initial Assessment     Do you have all of your prescriptions and are they filled?: Yes  Barriers to medication adherence: None  Are you able to afford your medications?: Yes  How often do you have trouble taking your medications the way you have been told to take them?: I always take them as prescribed. Do you have Home O2 Therapy?: No      Ability to seek help/take action for Emergent Urgent situations i.e. fire, crime, inclement weather or health crisis. : Independent  Ability to ambulate to restroom: Independent  Ability handle personal hygeine needs (bathing/dressing/grooming): Independent  Ability to manage Medications: Independent  Ability to prepare Food Preparation: Independent  Ability to maintain home (clean home, laundry): Independent  Ability to drive and/or has transportation: Independent  Ability to do shopping: Independent  Ability to manage finances:  Independent  Is patient able to live independently?: Yes     Current Housing: Private Residence  For whom are you the caregiver?: children  Does the person that you care for see a [facility] PCP?: No        Per the Fall Risk Screening, did the patient have 2 or more falls or 1 fall with injury in the past year?: No     Frequent urination at night?: No  Do you use rails/bars?: No  Do you have a non-slip tub mat?: Yes        Thinking about your patient's physical health needs, are there any symptoms or problems (risk indicators) you are unsure about that require further investigation?: Moderate to severe symptoms or problems that impact on daily life   Are the patients physical health problems impacting on their mental well-being?: Mild impact on mental well-being e.g. \"\"feeling fed-up\"\", \"\"reduced enjoyment\"\"   Are there any problems with your patients lifestyle behaviors (alcohol, drugs, diet, exercise) that are impacting on physical or mental well-being?: Some mild concern of potential negative impact on well-being   Do you have any other concerns about your patients mental well-being?  How would you rate their severity and impact on the patient?: Mild problems - don't interfere with function   How would you rate their home environment in terms of safety and stability (including domestic violence, insecure housing, neighbor harassment)?: Consistently safe, supportive, stable, no identified problems   How do daily activities impact on the patient's well-being? (include current or anticipated unemployment, work, caregiving, access to transportation or other): No identified problems or perceived positive benefits   How would you rate their social network (family, work, friends)?: Adequate participation with social networks   How would you rate their financial resources (including ability to afford all required medical care)?: Financially secure, some resource challenges   How wells does the patient now understand their health and well-being (symptoms, signs or risk factors) and what they need to do to manage their health?: Reasonable to good understanding and already engages in managing health or is willing to undertake better management   How well do you think your patient can engage in healthcare discussions? (Barriers include language, deafness, aphasia, alcohol or drug problems, learning difficulties, concentration): Clear and open communication, no identified barriers   Do other services need to be involved to help this patient?: Other care/services not in place and required   Are current services involved with this patient well-coordinated? (Include coordination with other services you are now recommendation): Required care/services missing and/or fragmented   Suggested Interventions and [employer] [employer]: Completed   Registered Dietician: In Process   Social Work: In Process         Set up/Review an Education Plan, Set up/Review Goals              Prior to Admission medications    Medication Sig Start Date End Date Taking? Authorizing Provider   ARIPiprazole (ABILIFY) 5 MG tablet Take 5 mg by mouth daily   Yes Jhonny Levine MD   omeprazole (PRILOSEC) 20 MG delayed release capsule Take 1 capsule by mouth 2 times daily (before meals) 11/23/22  Yes CAROLE Gill CNP   DULoxetine (CYMBALTA) 20 MG extended release capsule Take 1 capsule by mouth daily 11/23/22  Yes CAROLE Gill CNP   megestrol (MEGACE) 40 MG tablet Take 1 tablet by mouth daily 11/23/22  Yes CAROLE Gill CNP   fluticasone (FLONASE) 50 MCG/ACT nasal spray 2 sprays by Each Nostril route daily 11/23/22  Yes CAROLE Gill CNP   loratadine (CLARITIN) 10 MG tablet Take 1 tablet by mouth daily 11/23/22  Yes CAROLE Gill CNP   albuterol (PROVENTIL) (2.5 MG/3ML) 0.083% nebulizer solution Take 3 mLs by nebulization every 6 hours as needed for Wheezing Patient states is unsure of dosage for albuterol inhaler.  11/14/22  Yes CAROLE Gill CNP   buprenorphine (SUBUTEX) 8 MG SUBL SL tablet  10/21/22  Yes Historical Provider, MD   albuterol sulfate HFA (VENTOLIN HFA) 108 (90 Base) MCG/ACT inhaler Inhale 2 puffs into the lungs 4 times daily as needed for Wheezing 10/27/22  Yes CAROLE Castillo CNP   ALBUTEROL SULFATE HFA IN Inhale into the lungs   Yes Historical Provider, MD   dicyclomine (BENTYL) 10 MG capsule Take 1 capsule by mouth 3 times daily As needed for abdominal pain  Patient not taking: Reported on 11/25/2022 5/31/17   Arlin Cortez MD       Future Appointments   Date Time Provider Jorge Stevenson   11/28/2022  1:30 PM Kaya Rae MD [address] [facility]   12/21/2022  1:30 PM CAROLE Castillo CNP [address] [facility]      and   General Assessment    Do you have any symptoms that are causing concern?: Yes  Progression since Onset: Unchanged  Reported Symptoms: Weight Loss

## 2022-11-28 ENCOUNTER — CARE COORDINATION (OUTPATIENT)
Dept: CARE COORDINATION | Age: 42
End: 2022-11-28

## 2022-11-28 ENCOUNTER — INITIAL CONSULT (OUTPATIENT)
Dept: OBGYN | Age: 42
End: 2022-11-28
Payer: MEDICARE

## 2022-11-28 VITALS
HEIGHT: 71 IN | DIASTOLIC BLOOD PRESSURE: 55 MMHG | BODY MASS INDEX: 13.58 KG/M2 | WEIGHT: 97 LBS | SYSTOLIC BLOOD PRESSURE: 103 MMHG

## 2022-11-28 DIAGNOSIS — R10.2 VULVAR PAIN: ICD-10-CM

## 2022-11-28 DIAGNOSIS — Z01.419 ENCOUNTER FOR ANNUAL ROUTINE GYNECOLOGICAL EXAMINATION: Primary | ICD-10-CM

## 2022-11-28 DIAGNOSIS — N90.89 VULVAR LUMP: ICD-10-CM

## 2022-11-28 DIAGNOSIS — Z12.31 SCREENING MAMMOGRAM FOR BREAST CANCER: ICD-10-CM

## 2022-11-28 PROCEDURE — 99386 PREV VISIT NEW AGE 40-64: CPT | Performed by: OBSTETRICS & GYNECOLOGY

## 2022-11-28 ASSESSMENT — ENCOUNTER SYMPTOMS
GASTROINTESTINAL NEGATIVE: 1
ALLERGIC/IMMUNOLOGIC NEGATIVE: 1
EYES NEGATIVE: 1
RESPIRATORY NEGATIVE: 1

## 2022-11-28 NOTE — CARE COORDINATION
Return call received from Marjan/ BC/ RONDA MENDOZA. Confirmed that patient is receiving food card and OTC benefit from insurance company. Cm reports that nutritional supplements are not generally covered unless patient is in a SNF. Verbalized her plan to look into available resources for support. No questions or concerns noted. ACM contact information provided should questions arise. Message sent to CLARA with request for insight in r/t possible resources. Message received from CLARA. Boost coupons are not available. RD provided additional information to reflect patient need for nutritional supplement. Updated information passed along to BC/ BS KELLY.

## 2022-11-28 NOTE — PROGRESS NOTES
11/28/22    Tristen Jaramillo  1980    Chief Complaint   Patient presents with    New Patient     Pt here for annual, had hyster with ovary removal, is not currently sexually active, hrt-none, pap-2013-neg. Pt c/o vulvar lump and pain x on and off x 1 yr. Vulvar lump is not present today    The patient is a 38 y.o. female, [id number] who presents for her annual exam. She is menopausal.  She is not taking HRT. Martin Patella She reports additional symptoms of vulvar lesion. She has had a hysterectomy with removal of ovaries. She is not sexually active. Pap smear history: Her last PAP smear was in prior to hysterectomy. Her results were normal.    Breast history: she has never had a mammogram    Past Medical History:   Diagnosis Date    Anxiety     Arrhythmia     AV block, 2nd degree     mobitz type 1    Back injury     Bradycardia     Depression     Gastroparesis     GERD (gastroesophageal reflux disease)     H/O 24 hour EKG monitoring 06/01/2011    baseline rhythm is sinus amina  average 50bpm , episodes of wenchebach phenomenon with brief runs of sinus tachycardia.      H/O complete electrocardiogram 06/23/2011         Hx of echocardiogram 06/23/2011    normal size lv trace tricuspid regurg otherwise normal .    Scoliosis     SOB (shortness of breath)     Vulvar lump     Vulvar pain        Past Surgical History:   Procedure Laterality Date    APPENDECTOMY      BACK SURGERY      scoliosis    BREAST LUMPECTOMY      R breast    CHOLECYSTECTOMY      HYSTERECTOMY (CERVIX STATUS UNKNOWN)  11/10     OVARY REMOVAL  11/10    TONSILLECTOMY      TUBAL LIGATION         Family History   Problem Relation Age of Onset    Stroke Mother     High Cholesterol Mother     Mult Sclerosis Mother     Bleeding Prob Brother     Crohn's Disease Sister        Social History     Tobacco Use    Smoking status: Every Day     Packs/day: 0.50     Years: 30.00     Pack years: 15.00     Types: Cigarettes     Start date: 14    Smokeless tobacco: Never    Tobacco comments:     counseled to quit smoking. 2013   Vaping Use    Vaping Use: Never used   Substance Use Topics    Alcohol use: No    Drug use: Yes     Types: Marijuana Donelroy Jean)     Comment: history addiction percocet       Current Outpatient Medications   Medication Sig Dispense Refill    ARIPiprazole (ABILIFY) 5 MG tablet Take 5 mg by mouth daily      omeprazole (PRILOSEC) 20 MG delayed release capsule Take 1 capsule by mouth 2 times daily (before meals) 60 capsule 2    DULoxetine (CYMBALTA) 20 MG extended release capsule Take 1 capsule by mouth daily 30 capsule 3    megestrol (MEGACE) 40 MG tablet Take 1 tablet by mouth daily 30 tablet 3    loratadine (CLARITIN) 10 MG tablet Take 1 tablet by mouth daily 30 tablet 3    albuterol (PROVENTIL) (2.5 MG/3ML) 0.083% nebulizer solution Take 3 mLs by nebulization every 6 hours as needed for Wheezing Patient states is unsure of dosage for albuterol inhaler. 120 each 0    buprenorphine (SUBUTEX) 8 MG SUBL SL tablet       albuterol sulfate HFA (VENTOLIN HFA) 108 (90 Base) MCG/ACT inhaler Inhale 2 puffs into the lungs 4 times daily as needed for Wheezing 54 g 1    ALBUTEROL SULFATE HFA IN Inhale into the lungs      fluticasone (FLONASE) 50 MCG/ACT nasal spray 2 sprays by Each Nostril route daily (Patient not taking: Reported on 2022) 16 g 0    dicyclomine (BENTYL) 10 MG capsule Take 1 capsule by mouth 3 times daily As needed for abdominal pain (Patient not taking: Reported on 2022) 15 capsule 0     No current facility-administered medications for this visit. Allergies   Allergen Reactions    Amoxicillin Hives and Shortness Of Breath    Pcn [Penicillins] Hives and Shortness Of Breath    Sulfa Antibiotics     Meloxicam Nausea And Vomiting           Immunization History   Administered Date(s) Administered    Tdap (Boostrix, Adacel) 2013       Review of Systems   Constitutional: Negative. Eyes: Negative. Respiratory: Negative. Cardiovascular: Negative. Gastrointestinal: Negative. Endocrine: Negative. Genitourinary: Negative. Musculoskeletal: Negative. Skin: Negative. Allergic/Immunologic: Negative. Neurological: Negative. Hematological: Negative. Psychiatric/Behavioral: Negative. BP (!) 103/55   Ht 5' 11\" (1.803 m)   Wt 97 lb (44 kg)   BMI 13.53 kg/m²     Physical Exam  Exam conducted with a chaperone present. Constitutional:       Appearance: Normal appearance. HENT:      Head: Normocephalic and atraumatic. Nose: Nose normal.   Cardiovascular:      Rate and Rhythm: Normal rate. Pulses: Normal pulses. Pulmonary:      Effort: Pulmonary effort is normal.   Chest:      Chest wall: No mass, lacerations, deformity, swelling or tenderness. Breasts:     Right: Normal. No swelling, bleeding, inverted nipple, mass, nipple discharge, skin change or tenderness. Left: Normal. No swelling, bleeding, inverted nipple, mass, nipple discharge, skin change or tenderness. Abdominal:      General: Abdomen is flat. There is no distension. Palpations: Abdomen is soft. There is no mass. Tenderness: There is no abdominal tenderness. Hernia: No hernia is present. There is no hernia in the left inguinal area or right inguinal area. Genitourinary:     Exam position: Lithotomy position. Pubic Area: No rash. Labia:         Right: No rash, tenderness or lesion. Left: No rash, tenderness or lesion. Urethra: No prolapse, urethral pain, urethral swelling or urethral lesion. Vagina: Normal. No vaginal discharge, erythema, tenderness, bleeding or lesions. Uterus: Absent. Adnexa: Right adnexa normal and left adnexa normal.        Right: No mass, tenderness or fullness. Left: No mass, tenderness or fullness. Rectum: No mass or anal fissure. Normal anal tone. Musculoskeletal:      Cervical back: Normal range of motion and neck supple. Lymphadenopathy:      Upper Body:      Right upper body: No supraclavicular, axillary or pectoral adenopathy. Left upper body: No supraclavicular, axillary or pectoral adenopathy. Lower Body: No right inguinal adenopathy. No left inguinal adenopathy. Skin:     General: Skin is warm and dry. Neurological:      General: No focal deficit present. Mental Status: She is alert and oriented to person, place, and time. Psychiatric:         Mood and Affect: Mood normal.         Behavior: Behavior normal.         Thought Content: Thought content normal.         Judgment: Judgment normal.       No results found for this visit on 11/28/22. Assessment and Plan   Diagnosis Orders   1. Encounter for annual routine gynecological examination        2. Vulvar lump        3. Vulvar pain        4. Screening mammogram for breast cancer  DEVAN DIGITAL SCREEN W CAD BILATERAL PER PROTOCOL      Follow up when lump is present      Return in about 1 year (around 11/28/2023).     Stefania Farley MD

## 2022-11-28 NOTE — CARE COORDINATION
Justin Jaramillo  November 28, 2022    Initial Referral Reason: Underweight- BMI 13.58     Patient Care Team:  CAROLE Mojica CNP as PCP - General (Certified Nurse Practitioner)  CAROLE Mojica CNP as PCP - [facility]  Carrol Rodriguez MD as Consulting Physician (Cardiology)  Tico Harris MD as Consulting Physician (Obstetrics & Gynecology)  Denia Dunham RN as Ambulatory Care Manager  Meredith Cage RD, LD as Dietitian (Dietitian Registered)    Past Medical History:    Current Outpatient Medications   Medication Sig Dispense Refill    ARIPiprazole (ABILIFY) 5 MG tablet Take 5 mg by mouth daily      omeprazole (PRILOSEC) 20 MG delayed release capsule Take 1 capsule by mouth 2 times daily (before meals) 60 capsule 2    DULoxetine (CYMBALTA) 20 MG extended release capsule Take 1 capsule by mouth daily 30 capsule 3    megestrol (MEGACE) 40 MG tablet Take 1 tablet by mouth daily 30 tablet 3    fluticasone (FLONASE) 50 MCG/ACT nasal spray 2 sprays by Each Nostril route daily 16 g 0    loratadine (CLARITIN) 10 MG tablet Take 1 tablet by mouth daily 30 tablet 3    albuterol (PROVENTIL) (2.5 MG/3ML) 0.083% nebulizer solution Take 3 mLs by nebulization every 6 hours as needed for Wheezing Patient states is unsure of dosage for albuterol inhaler. 120 each 0    buprenorphine (SUBUTEX) 8 MG SUBL SL tablet       albuterol sulfate HFA (VENTOLIN HFA) 108 (90 Base) MCG/ACT inhaler Inhale 2 puffs into the lungs 4 times daily as needed for Wheezing 54 g 1    dicyclomine (BENTYL) 10 MG capsule Take 1 capsule by mouth 3 times daily As needed for abdominal pain (Patient not taking: Reported on 11/25/2022) 15 capsule 0    ALBUTEROL SULFATE HFA IN Inhale into the lungs       No current facility-administered medications for this visit.        Biochemical Data, Medical Tests and Procedures:    No results found for: LABA1C  No results found for: EAG    No results found for: CHOL  No results found for: TRIG  Lab Results   Component Value Date    HDL 45 10/27/2022     Lab Results   Component Value Date    LDLCALC 97 10/27/2022       Anthropometric Measurements:    Height: 71 inches (180.3 cm)  Weight: 97 lb (44.2 kg)  BMI: 13.58 (underweight)   IBW: 155 lb (70.5 kg) +-10%  %IBW: 62.6%    Physical Exam Findings:  Deferred    Nutrition Interview: RD called pt, explained reason for call and role in care. [facility]     Seeking support for healthy weight gain. Patient reports difficulty gaining weight d/t previous history of addiction to pain medications and resulting gastroparesis. Patient is very engaged to get back on track. Patient has been drinking Boost and a children's energy drink. Eats one meal a day. Recently started on Megace. Working with insurance company to assess coverage for Nutritional supplement; patient really likes vanilla Boost.      Pt states she is hungry but cannot eat because she feels like she is going to puke as she is chewing the food. Patient states she tries to eat at least one meal/day. Patient states she is drinking Boost and Breakfast Essential- per pt she drinks at least three ONS daily (etiher Boost or Breakfast Essential). Pt states she buys the Breakfast Essential with 10 grams of protein and 240 calories. RD does not have Boost coupons but has Ensure coupons. RD will mail Ensure coupons to patient. Per chart review, patient's weight documented on 11/23/22 was 97 lb. RD noted patient's BMI is 13.58 (underweight) and patient's %IBW is 62.6%. RD explained the importance of monitoring her weight closely- patient has a scale at home and will start weighing herself at least a few days each week. RD explained the importance of meeting estimated nutrition needs daily. Explained the importance of following a high calorie high protein diet to help promote weight gain. Reviewed protein sources and tips for adding protein to meals/snacks.  CLARA discussed eating small frequent meals due to gastroparesis. RD discussed the benefits of utilizing oral nutrition supplements such as Ensure or Boost.  Explained that oral nutrition supplements offer protein, carbohydrates, and vitamins/minerals that the patient may be missing out on if not eating adequately. RD recommended drinking ONS at least 3-5x/day to help patient better meet estimated nutrition needs. Pt verbalizes understanding. Pt has no nutrition related questions or concerns at this time. RD offered to mail educational handouts to pt to reinforce concepts discussed during phone conversation, pt accepted and very appreciative. RD verified address. 24-Hour Diet Recall  Breakfast  Consumed: none    Lunch  Consumed: none    Dinner  Consumed: tacos    Beverages: water, Boost and Breakfast Essential     Nutrition Diagnosis:  #1 Problem Inadequate energy intake       Etiology related to poor appetite       Signs/Symptoms as evidenced by underweight (BMI 13.58),  %IBW 62.6%, recently started on Megace and need for ONS daily       Nutrition Intervention:     Estimated Needs  regular diet providing [id number] kcals to promote wt gain ([place] based on IBW). Estimated daily Protein Needs: 70-85 g (based on 1.0-1.2 g/kg based on IBW)  Estimated daily Fluid Needs: per MD    #1 Nutrition Information: Provided patient with High Protein High Calorie Nutrition Therapy, Tips for Adding Protein, Gastroparesis Nutrition Therapy handouts. For reinforcement of concepts discussed during nutrition interview. #2 Nutrition Counseling: Used open-ended questions to assess patients perceived susceptibility and severity of disease state. Discussed potential impact of health threat on patient's lifestyle. Used open-ended questions to assess patient's perception regarding benefits of and barriers to implementation of nutrition therapy. #3 Nutrition Education: Clearly defined the benefits of nutrition therapy. Summarized and affirmed positive aspects of current nutrition patterns. Provided education regarding value of adherence to regular diet. Discussed ways to establish applying concepts of alternatives and choices regarding implementation of diet. Explored ideas for small, incremental goals to initiate behavior change. Monitoring and Evaluation:    Indicator/Goal Criteria   #1 Eat small balanced meals consistently throughout the day. #1 Focus on eating small meals frequently. #2 Monitor weekly weights and keep a log. #2 Weigh self at least 3 days each week and keep a log. Monitor closely for additional weight loss. Follow Up: RD will call pt in 2 weeks to follow up and make sure pt received handouts in mail. RD will answer any nutrition related questions at this time.      [address], [facility]

## 2022-11-29 ENCOUNTER — CARE COORDINATION (OUTPATIENT)
Dept: CARE COORDINATION | Age: 42
End: 2022-11-29

## 2022-11-29 NOTE — CARE COORDINATION
HC contacted patient in regards to referral from [facility] for possible need for assistance with food. HC explains role and reason for call and patient is agreeable to assistance. Patient assessment completed. Patient confirms that she struggles with the cost of food some months but not every month. Patient states that she is aware of the local food [employer] and utilizes them on the months that she is short. Patient states that she receives assistance with her utilities. Patient states that she also receives $595/month in food assistance. Patient reports that she has stable housing and transportation. Patient states that she has not yet applied for Medicaid and is encouraged to do so. Patient is provided with [address] [place] [address] Dr Benefits and [facility] contact information. Patient is agreeable to complete application. Patient denies any further needs or concerns at this time. HC will reach out to patient again next week to follow up.

## 2022-12-07 ENCOUNTER — CARE COORDINATION (OUTPATIENT)
Dept: CARE COORDINATION | Age: 42
End: 2022-12-07

## 2022-12-07 NOTE — CARE COORDINATION
HC will sign off of patients care team at this time and Kailyn Breaux will follow up with patient in regards to social needs as needed.

## 2022-12-08 ENCOUNTER — CARE COORDINATION (OUTPATIENT)
Dept: CARE COORDINATION | Age: 42
End: 2022-12-08

## 2022-12-08 NOTE — CARE COORDINATION
Attempted phone call to Pt to introduce self and follow up with Pt regarding medicaid application process. No answer, voicemail message left requesting return call, contact number provided. SW plan of care:  SW will follow up with Pt on 12/13 regarding medicaid application and introduction to this SW.

## 2022-12-09 ENCOUNTER — CARE COORDINATION (OUTPATIENT)
Dept: CARE COORDINATION | Age: 42
End: 2022-12-09

## 2022-12-09 NOTE — CARE COORDINATION
Contacted [facility] and left voicemail regarding Dietitian follow up. Left call back number and will follow up as appropriate.          [address] [facility], [address] [place] [address]

## 2022-12-13 ENCOUNTER — CARE COORDINATION (OUTPATIENT)
Dept: CARE COORDINATION | Age: 42
End: 2022-12-13

## 2022-12-13 NOTE — CARE COORDINATION
Attempted phone call to Pt to introduce self and follow up regarding medicaid application process. No answer, voicemail message left requesting return call, contact number provided. BILLY plan of care:  BILLY will follow up with Pt regarding application for medicaid and to introduce self in one week on 12/20.

## 2022-12-14 ENCOUNTER — CARE COORDINATION (OUTPATIENT)
Dept: CARE COORDINATION | Age: 42
End: 2022-12-14

## 2022-12-14 NOTE — CARE COORDINATION
Contacted [facility] and left voicemail regarding Dietitian follow up. Left call back number. RD spoke with patient for initial nutrition assessment on 11/28/22. RD outreached 12/9/22 and today 12/14/22- left VM both outreaches. RD will notify Jignesh SHAFER. RD will continue to follow/assist with patient return call.        [address] [facility], [address] [place] [address]

## 2022-12-19 ENCOUNTER — CARE COORDINATION (OUTPATIENT)
Dept: CARE COORDINATION | Age: 42
End: 2022-12-19

## 2022-12-21 ENCOUNTER — CARE COORDINATION (OUTPATIENT)
Dept: CARE COORDINATION | Age: 42
End: 2022-12-21

## 2022-12-22 ENCOUNTER — CARE COORDINATION (OUTPATIENT)
Dept: CARE COORDINATION | Age: 42
End: 2022-12-22

## 2022-12-22 NOTE — CARE COORDINATION
Attempted phone call to Pt to complete initial sw assessment. No answer, voicemail message left requesting return call, contact number provided. SW plan of care:  SW will make one additional outreach attempt on 12/27. SW will close if no answer, response.

## 2022-12-27 ENCOUNTER — CARE COORDINATION (OUTPATIENT)
Dept: CARE COORDINATION | Age: 42
End: 2022-12-27

## 2024-01-05 ENCOUNTER — OFFICE VISIT (OUTPATIENT)
Dept: FAMILY MEDICINE CLINIC | Age: 44
End: 2024-01-05
Payer: MEDICARE

## 2024-01-05 VITALS
HEIGHT: 71 IN | SYSTOLIC BLOOD PRESSURE: 98 MMHG | OXYGEN SATURATION: 95 % | DIASTOLIC BLOOD PRESSURE: 68 MMHG | WEIGHT: 103.4 LBS | BODY MASS INDEX: 14.48 KG/M2 | HEART RATE: 73 BPM

## 2024-01-05 DIAGNOSIS — B02.8 HERPES ZOSTER WITH COMPLICATION: Primary | ICD-10-CM

## 2024-01-05 PROCEDURE — 99213 OFFICE O/P EST LOW 20 MIN: CPT | Performed by: PHYSICIAN ASSISTANT

## 2024-01-05 RX ORDER — VALACYCLOVIR HYDROCHLORIDE 1 G/1
1000 TABLET, FILM COATED ORAL 3 TIMES DAILY
Qty: 21 TABLET | Refills: 0 | Status: SHIPPED | OUTPATIENT
Start: 2024-01-05 | End: 2024-01-12

## 2024-01-05 NOTE — PROGRESS NOTES
1/5/2024    Justina Jaramillo    Chief Complaint   Patient presents with    Rash     - rash, red sores right side forehead & right eye lid, painful, pt also noticed painful bumps right side of scalp. 5 days. Went to urgent care 1/2/24 was informed rash just keep area clean with antibacterial soap. Pt states sores originally looked like pimples so she popped one then rash spread and then became painful.        HPI  History was obtained from patient.   Justina is a 43 y.o. female who presents today with complaints of skin rash right side of scalp and right forehead for 5 days.  She now has a single lesion on the right eyelid and has noted blurry vision today.  She denies eye redness or eye pain, or light sensitivity.  The rash is starting to dry out.  She has intermittent sharp stabbing pains on scalp and forehead.  She [facility] a local [facility] 3 days ago and received no treatment for this rash.  No history of shingles but does recall having chickenpox in childhood.    REVIEW OF SYMPTOMS    Constitutional:  Denies fever, chills, weight loss or weakness  Eyes: See HPI  ENT:  Denies nasal congestion, postnasal drip, sore throat or ear pain  Cardiovascular:  Denies chest pain  Respiratory:  Denies cough or shortness of breath  GI:  Denies nausea, vomiting  Skin: See HPI  Neurologic:  Denies focal weakness, or sensory changes      PAST MEDICAL HISTORY  Past Medical History:   Diagnosis Date    Anxiety     Arrhythmia     AV block, 2nd degree     mobitz type 1    Back injury     Bradycardia     Depression     Gastroparesis     GERD (gastroesophageal reflux disease)     H/O 24 hour EKG monitoring 06/01/2011    baseline rhythm is sinus amina  average 50bpm , episodes of wenchebach phenomenon with brief runs of sinus tachycardia.     H/O complete electrocardiogram 06/23/2011         Hx of echocardiogram 06/23/2011    normal size lv trace tricuspid regurg otherwise normal .    Scoliosis     SOB (shortness of breath)

## 2024-05-28 NOTE — ED NOTES
Called for room placement with no answer.       Aissatou Otoole RN  06/30/20 [address]  [id number]  female  60 year old      Reason for procedure/surgery: abdominal pain    Patient Active Problem List   Diagnosis    Neck mass    Moderate major depression (H)    Anxiety    Vitamin D deficiency disease    CARDIOVASCULAR SCREENING; LDL GOAL LESS THAN 100    GERD (gastroesophageal reflux disease)    Hypertension goal BP (blood pressure) < 140/80    Type 2 diabetes mellitus without complication (H)    Chronic airway obstruction (H)    Advanced directives, counseling/discussion    Autoimmune gastritis- repeat UGI 1/16 with gastritis without eosinophils    Urinary incontinence    Osteoarthritis of patellofemoral joint    Morbid obesity (H)    Tobacco abuse    Lumbar radiculopathy    Hyperlipidemia LDL goal <100    Hypothyroidism, unspecified type    Chronic obstructive pulmonary disease, unspecified COPD type (H)    Abnormal CT lung screening    Spondylolisthesis at L5-S1 level    Foraminal stenosis of lumbar region    Chronic bilateral low back pain with right-sided sciatica    Chest pain, musculoskeletal    Clostridium difficile colitis    Excessive or frequent menstruation    Graves' disease    Impaired fasting glucose    Mucoepidermoid carcinoma of parotid gland (H)    Other abnormal glucose    Rhinitis    Secondary dysmenorrhea    Left flank pain    Pneumatosis intestinalis    Abdominal pain, unspecified abdominal location    Other microscopic hematuria       Past Surgical History:    Past Surgical History:   Procedure Laterality Date    ABDOMEN SURGERY  1986    Tubal    ABLATION, ENDOMETRIAL, THERMAL, W/O HYSTEROSCOPIC GUIDANCE      BIOPSY  2010    Neck    COLONOSCOPY  2011    COLONOSCOPY N/A 10/28/2022    Procedure: COLONOSCOPY with polypectomy;  Surgeon: Michael Enciso MD;  Location: [facility] OR    DILATION AND CURETTAGE, HYSTEROSCOPY, ABLATE ENDOMETRIUM NOVASURE, COMBINED  10/11/2012    Procedure: COMBINED DILATION AND CURETTAGE, HYSTEROSCOPY, ABLATE ENDOMETRIUM NOVASURE;  COMBINED  DILATION AND CURETTAGE, HYSTEROSCOPY, ABLATE ENDOMETRIUM ,pelvic exam under anesthesia;  Surgeon: Shruit Barrios MD;  Location: [facility] OR    ESOPHAGOSCOPY, GASTROSCOPY, DUODENOSCOPY (EGD), COMBINED N/A 01/19/2016    Procedure: COMBINED ESOPHAGOSCOPY, GASTROSCOPY, DUODENOSCOPY (EGD), BIOPSY SINGLE OR MULTIPLE;  Surgeon: Kristofer Molina MD;  Location:  GI    GENITOURINARY SURGERY  1986 tubes tied & burned    HEAD & NECK SURGERY  removal of cancerous limp knod 2010    Removal of cancerous lump on neck      TONSILLECTOMY         Past Medical History:   Past Medical History:   Diagnosis Date    Arthritis 2016    i feel like its in my hands    Chronic pain     in both legs    COPD (chronic obstructive pulmonary disease)     Depressive disorder 1990    Diabetes mellitus - type 2     Gastro-oesophageal reflux disease     Hypertension     Hypothyroidism     Kidney stone     3 episodes of stones    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, s/p resection    Uncomplicated asthma 2010       Social History:   Social History     Tobacco Use    Smoking status: Every Day     Current packs/day: 0.50     Average packs/day: 0.5 packs/day for 45.4 years (22.7 ttl pk-yrs)     Types: Cigarettes     Start date: 1/1/1979     Passive exposure: Current    Smokeless tobacco: Never   Substance Use Topics    Alcohol use: No       Family History:   Family History   Problem Relation Age of Onset    Diabetes Mother     Breast Cancer Mother     Hyperlipidemia Mother     Osteoporosis Mother     Diabetes Father     Lung Cancer Father     Rheumatoid Arthritis Father     Coronary Artery Disease Father     Hyperlipidemia Father     Cerebrovascular Disease Father     Prostate Cancer Father     Diabetes Maternal Grandmother     Diabetes Other     Hypertension Other     Depression Other     Anxiety Disorder Other     Asthma Other     Thyroid Disease Other     Obesity Other     Diabetes Sister     Obesity Sister     Diabetes Sister      Diabetes Brother     Diabetes Daughter     Breast Cancer Sister     Anesthesia Reaction Daughter        Allergies:   Allergies   Allergen Reactions    Food      PN: LW FI1: nka LW FI2: nka    Hydrocodone-Acetaminophen Nausea and Vomiting    Ibuprofen Sodium Nausea and Vomiting    Lisinopril Cough       Active Medications:   No current outpatient medications on file.       Systemic Review:   CONSTITUTIONAL: NEGATIVE for fever, chills, change in weight  ENT/MOUTH: NEGATIVE for ear, mouth and throat problems  RESP: NEGATIVE for significant cough or SOB  CV: NEGATIVE for chest pain, palpitations or peripheral edema    Physical Examination:   Vital Signs: BP (!) 150/86   Pulse 71   Resp 16   SpO2 98%   GENERAL: healthy, alert and no distress  NECK: no adenopathy, no asymmetry, masses, or scars  RESP: lungs clear to auscultation - no rales, rhonchi or wheezes  CV: regular rate and rhythm, normal S1 S2, no S3 or S4, no murmur, click or rub, no peripheral edema and peripheral pulses strong  ABDOMEN: soft, nontender, no hepatosplenomegaly, no masses and bowel sounds normal  MS: no gross musculoskeletal defects noted, no edema    Plan: Appropriate to proceed as scheduled.      William Borden MD, MD  5/28/2024    PCP:  Sharron Justice

## 2024-07-08 ENCOUNTER — OFFICE VISIT (OUTPATIENT)
Dept: OBGYN | Age: 44
End: 2024-07-08
Payer: MEDICARE

## 2024-07-08 VITALS
DIASTOLIC BLOOD PRESSURE: 63 MMHG | SYSTOLIC BLOOD PRESSURE: 114 MMHG | WEIGHT: 98 LBS | HEIGHT: 68 IN | BODY MASS INDEX: 14.85 KG/M2

## 2024-07-08 DIAGNOSIS — N93.9 VAGINAL BLEEDING: ICD-10-CM

## 2024-07-08 DIAGNOSIS — Z12.31 SCREENING MAMMOGRAM FOR BREAST CANCER: ICD-10-CM

## 2024-07-08 DIAGNOSIS — R30.0 DYSURIA: ICD-10-CM

## 2024-07-08 DIAGNOSIS — R10.2 PELVIC PAIN SYNDROME: ICD-10-CM

## 2024-07-08 DIAGNOSIS — Z01.419 ENCOUNTER FOR ANNUAL ROUTINE GYNECOLOGICAL EXAMINATION: Primary | ICD-10-CM

## 2024-07-08 DIAGNOSIS — R35.0 FREQUENT URINATION: ICD-10-CM

## 2024-07-08 LAB
BILIRUBIN, POC: NORMAL
BLOOD URINE, POC: NORMAL
CLARITY, POC: NORMAL
COLOR, POC: NORMAL
GLUCOSE URINE, POC: NORMAL
KETONES, POC: NORMAL
LEUKOCYTE EST, POC: NORMAL
NITRITE, POC: NORMAL
PH, POC: NORMAL
PROTEIN, POC: NORMAL
SPECIFIC GRAVITY, POC: NORMAL
UROBILINOGEN, POC: NORMAL

## 2024-07-08 PROCEDURE — G0101 CA SCREEN;PELVIC/BREAST EXAM: HCPCS | Performed by: OBSTETRICS & GYNECOLOGY

## 2024-07-08 PROCEDURE — 81002 URINALYSIS NONAUTO W/O SCOPE: CPT | Performed by: OBSTETRICS & GYNECOLOGY

## 2024-07-08 RX ORDER — NITROFURANTOIN 25; 75 MG/1; MG/1
100 CAPSULE ORAL 2 TIMES DAILY
Qty: 14 CAPSULE | Refills: 0 | Status: SHIPPED | OUTPATIENT
Start: 2024-07-08 | End: 2024-07-15

## 2024-07-08 RX ORDER — PHENAZOPYRIDINE HYDROCHLORIDE 200 MG/1
200 TABLET, FILM COATED ORAL 3 TIMES DAILY PRN
Qty: 9 TABLET | Refills: 0 | Status: SHIPPED | OUTPATIENT
Start: 2024-07-08 | End: 2024-07-11

## 2024-07-08 NOTE — PROGRESS NOTES
No mass or anal fissure. Normal anal tone.   Musculoskeletal:      Cervical back: Normal range of motion and neck supple.   Lymphadenopathy:      Upper Body:      Right upper body: No supraclavicular, axillary or pectoral adenopathy.      Left upper body: No supraclavicular, axillary or pectoral adenopathy.      Lower Body: No right inguinal adenopathy. No left inguinal adenopathy.   Skin:     General: Skin is warm and dry.   Neurological:      General: No focal deficit present.      Mental Status: She is alert and oriented to person, place, and time.   Psychiatric:         Mood and Affect: Mood normal.         Behavior: Behavior normal.         Thought Content: Thought content normal.         Judgment: Judgment normal.         Results for orders placed or performed in visit on 07/08/24   POCT Urinalysis no Micro   Result Value Ref Range    Color, UA      Clarity, UA      Glucose, UA POC neg     Bilirubin, UA      Ketones, UA      Spec Grav, UA      Blood, UA POC 3+     pH, UA      Protein, UA POC +     Urobilinogen, UA      Leukocytes, UA 3+     Nitrite, UA +        ASSESSMENT AND PLAN   Diagnosis Orders   1. Encounter for annual routine gynecological examination        2. Frequent urination  POCT Urinalysis no Micro    Urinalysis with Microscopic    Culture, Urine    C.trachomatis N.gonorrhoeae DNA, Urine    nitrofurantoin, macrocrystal-monohydrate, (MACROBID) 100 MG capsule    phenazopyridine (PYRIDIUM) 200 MG tablet      3. Dysuria  POCT Urinalysis no Micro    Urinalysis with Microscopic    Culture, Urine    C.trachomatis N.gonorrhoeae DNA, Urine    nitrofurantoin, macrocrystal-monohydrate, (MACROBID) 100 MG capsule    phenazopyridine (PYRIDIUM) 200 MG tablet      4. Vaginal bleeding        5. Screening mammogram for breast cancer  DEVAN NIKKI DIGITAL SCREEN BILATERAL PER PROTOCOL      6. Pelvic pain syndrome            Return in about 1 year (around 7/8/2025).    Rashid Marshall MD

## 2024-07-09 LAB
AMORPH SED URNS QL MICRO: ABNORMAL /HPF
BACTERIA URNS QL MICRO: ABNORMAL /HPF
BILIRUB UR QL STRIP.AUTO: NEGATIVE
C TRACH DNA UR QL NAA+PROBE: NEGATIVE
CHARACTER UR: ABNORMAL
CLARITY UR: ABNORMAL
COLOR UR: YELLOW
EPI CELLS #/AREA URNS HPF: ABNORMAL /HPF (ref 0–5)
GLUCOSE UR STRIP.AUTO-MCNC: NEGATIVE MG/DL
HGB UR QL STRIP.AUTO: ABNORMAL
KETONES UR STRIP.AUTO-MCNC: NEGATIVE MG/DL
LEUKOCYTE ESTERASE UR QL STRIP.AUTO: ABNORMAL
MUCOUS THREADS #/AREA URNS LPF: ABNORMAL /LPF
N GONORRHOEA DNA UR QL NAA+PROBE: NEGATIVE
NITRITE UR QL STRIP.AUTO: POSITIVE
PH UR STRIP.AUTO: 7 [PH] (ref 5–8)
PROT UR STRIP.AUTO-MCNC: 30 MG/DL
RBC #/AREA URNS HPF: ABNORMAL /HPF (ref 0–4)
SP GR UR STRIP.AUTO: 1.01 (ref 1–1.03)
UA DIPSTICK W REFLEX MICRO PNL UR: YES
URN SPEC COLLECT METH UR: ABNORMAL
UROBILINOGEN UR STRIP-ACNC: 0.2 E.U./DL
WBC #/AREA URNS HPF: ABNORMAL /HPF (ref 0–5)

## 2024-07-11 LAB
BACTERIA UR CULT: ABNORMAL
ORGANISM: ABNORMAL

## 2025-01-31 ENCOUNTER — OFFICE VISIT (OUTPATIENT)
Dept: FAMILY MEDICINE CLINIC | Age: 45
End: 2025-01-31

## 2025-01-31 VITALS
WEIGHT: 99.9 LBS | SYSTOLIC BLOOD PRESSURE: 110 MMHG | BODY MASS INDEX: 15.14 KG/M2 | OXYGEN SATURATION: 96 % | HEIGHT: 68 IN | DIASTOLIC BLOOD PRESSURE: 74 MMHG | HEART RATE: 82 BPM

## 2025-01-31 DIAGNOSIS — Z13.29 THYROID DISORDER SCREENING: ICD-10-CM

## 2025-01-31 DIAGNOSIS — I44.1 AV BLOCK, 2ND DEGREE: ICD-10-CM

## 2025-01-31 DIAGNOSIS — Z00.00 INITIAL MEDICARE ANNUAL WELLNESS VISIT: Primary | ICD-10-CM

## 2025-01-31 DIAGNOSIS — M41.9 SCOLIOSIS OF THORACIC SPINE, UNSPECIFIED SCOLIOSIS TYPE: ICD-10-CM

## 2025-01-31 DIAGNOSIS — Z13.220 SCREENING CHOLESTEROL LEVEL: ICD-10-CM

## 2025-01-31 DIAGNOSIS — F41.9 ANXIETY AND DEPRESSION: ICD-10-CM

## 2025-01-31 DIAGNOSIS — F32.A ANXIETY AND DEPRESSION: ICD-10-CM

## 2025-01-31 DIAGNOSIS — Z86.2 HISTORY OF ANEMIA: ICD-10-CM

## 2025-01-31 DIAGNOSIS — M41.9 SCOLIOSIS OF LUMBAR SPINE, UNSPECIFIED SCOLIOSIS TYPE: ICD-10-CM

## 2025-01-31 RX ORDER — QUETIAPINE FUMARATE 25 MG/1
12.5-25 TABLET, FILM COATED ORAL
Qty: 30 TABLET | Refills: 1 | Status: SHIPPED | OUTPATIENT
Start: 2025-01-31

## 2025-01-31 RX ORDER — FLUTICASONE PROPIONATE AND SALMETEROL 250; 50 UG/1; UG/1
1 POWDER RESPIRATORY (INHALATION) 2 TIMES DAILY
COMMUNITY
Start: 2025-01-22

## 2025-01-31 SDOH — ECONOMIC STABILITY: FOOD INSECURITY: WITHIN THE PAST 12 MONTHS, YOU WORRIED THAT YOUR FOOD WOULD RUN OUT BEFORE YOU GOT MONEY TO BUY MORE.: NEVER TRUE

## 2025-01-31 SDOH — ECONOMIC STABILITY: FOOD INSECURITY: WITHIN THE PAST 12 MONTHS, THE FOOD YOU BOUGHT JUST DIDN'T LAST AND YOU DIDN'T HAVE MONEY TO GET MORE.: NEVER TRUE

## 2025-01-31 ASSESSMENT — PATIENT HEALTH QUESTIONNAIRE - PHQ9
SUM OF ALL RESPONSES TO PHQ9 QUESTIONS 1 & 2: 6
7. TROUBLE CONCENTRATING ON THINGS, SUCH AS READING THE NEWSPAPER OR WATCHING TELEVISION: NEARLY EVERY DAY
SUM OF ALL RESPONSES TO PHQ QUESTIONS 1-9: 21
10. IF YOU CHECKED OFF ANY PROBLEMS, HOW DIFFICULT HAVE THESE PROBLEMS MADE IT FOR YOU TO DO YOUR WORK, TAKE CARE OF THINGS AT HOME, OR GET ALONG WITH OTHER PEOPLE: EXTREMELY DIFFICULT
5. POOR APPETITE OR OVEREATING: NEARLY EVERY DAY
SUM OF ALL RESPONSES TO PHQ QUESTIONS 1-9: 21
4. FEELING TIRED OR HAVING LITTLE ENERGY: NEARLY EVERY DAY
6. FEELING BAD ABOUT YOURSELF - OR THAT YOU ARE A FAILURE OR HAVE LET YOURSELF OR YOUR FAMILY DOWN: NEARLY EVERY DAY
1. LITTLE INTEREST OR PLEASURE IN DOING THINGS: NEARLY EVERY DAY
2. FEELING DOWN, DEPRESSED OR HOPELESS: NEARLY EVERY DAY
8. MOVING OR SPEAKING SO SLOWLY THAT OTHER PEOPLE COULD HAVE NOTICED. OR THE OPPOSITE, BEING SO FIGETY OR RESTLESS THAT YOU HAVE BEEN MOVING AROUND A LOT MORE THAN USUAL: NOT AT ALL
9. THOUGHTS THAT YOU WOULD BE BETTER OFF DEAD, OR OF HURTING YOURSELF: NOT AT ALL
SUM OF ALL RESPONSES TO PHQ QUESTIONS 1-9: 21
3. TROUBLE FALLING OR STAYING ASLEEP: NEARLY EVERY DAY
SUM OF ALL RESPONSES TO PHQ QUESTIONS 1-9: 21

## 2025-01-31 ASSESSMENT — COLUMBIA-SUICIDE SEVERITY RATING SCALE - C-SSRS
1. WITHIN THE PAST MONTH, HAVE YOU WISHED YOU WERE DEAD OR WISHED YOU COULD GO TO SLEEP AND NOT WAKE UP?: NO
2. HAVE YOU ACTUALLY HAD ANY THOUGHTS OF KILLING YOURSELF?: NO
6. HAVE YOU EVER DONE ANYTHING, STARTED TO DO ANYTHING, OR PREPARED TO DO ANYTHING TO END YOUR LIFE?: NO

## 2025-01-31 ASSESSMENT — LIFESTYLE VARIABLES
HOW MANY STANDARD DRINKS CONTAINING ALCOHOL DO YOU HAVE ON A TYPICAL DAY: PATIENT DOES NOT DRINK
HOW OFTEN DO YOU HAVE A DRINK CONTAINING ALCOHOL: NEVER

## 2025-01-31 NOTE — PROGRESS NOTES
Medicare Annual Wellness Visit    Justina Jaramillo is here for Medicare AWV, Anorexia (- loss of appetite, pt states when she eats she normally has to vomit. Doesn't see gi specialist), and Back Pain (- mid back pain - constant. 1 year. Currently taking ibuprofen without success. )    Assessment & Plan   Initial Medicare annual wellness visit  AV block, 2nd degree  -     Larry Larsen MD, Cardiology, [place]  -     Magnesium; Future  History of anemia  -     CBC with Auto Differential; Future  -     Folate; Future  -     Vitamin B12; Future  -     Iron and TIBC; Future  -     Ferritin; Future  Anxiety and depression  -     Comprehensive Metabolic Panel; Future  -     QUEtiapine (SEROQUEL) 25 MG tablet; Take 0.5-1 tablets by mouth nightly, Disp-30 tablet, R-1Allergic to yellow dyes- if available without yellow dye she will need this option. If not available please notify office.Normal  Scoliosis of thoracic spine, unspecified scoliosis type  -     XR THORACIC SPINE (3 VIEWS); Future  Screening cholesterol level  -     Lipid, Fasting; Future  Thyroid disorder screening  -     TSH reflex to FT4; Future  Scoliosis of lumbar spine, unspecified scoliosis type  -     XR LUMBAR SPINE (MIN 4 VIEWS); Future     Please see AWV results as below.  With regard to chronic healthcare issues will initiate medication for both mood and sleep in addition to MSW referral for assistance in finding proper dental care    Return in about 4 weeks (around 2/28/2025).     Subjective       Patient's complete Health Risk Assessment and screening values have been reviewed and are found in Flowsheets. The following problems were reviewed today and where indicated follow up appointments were made and/or referrals ordered.    Positive Risk Factor Screenings with Interventions:    Fall Risk:  Do you feel unsteady or are you worried about falling? : (!) yes (dizziness)  2 or more falls in past year?: (!) yes  Fall with injury in past 
     General: She is awake. She is not in acute distress.     Appearance: She is well-groomed. She is cachectic. She is ill-appearing. She is not toxic-appearing or diaphoretic.   HENT:      Mouth/Throat:      Dentition: Abnormal dentition. Dental caries present.   Eyes:      General: No scleral icterus.     Conjunctiva/sclera: Conjunctivae normal.   Cardiovascular:      Rate and Rhythm: Normal rate.   Pulmonary:      Effort: Pulmonary effort is normal. No respiratory distress.   Musculoskeletal:      Thoracic back: Deformity and tenderness present. Scoliosis present.      Lumbar back: Deformity and tenderness present. Scoliosis present.   Neurological:      Mental Status: She is alert.   Psychiatric:         Attention and Perception: Attention and perception normal.         Mood and Affect: Mood is anxious and depressed.         Speech: Speech normal.         Behavior: Behavior is cooperative.         Thought Content: Thought content normal.         Cognition and Memory: Cognition normal.         Judgment: Judgment normal.         ASSESSMENT & PLAN  1. Initial Medicare annual wellness visit  For AWV specifics please see AWV note.     2. AV block, 2nd degree  Will update magnesium and refer back to cardiology for further monitoring.   - Larry Larsen MD, Cardiology, [place]  - Magnesium; Future    3. History of anemia  Will update lab work and make recommendations based on results  - CBC with Auto Differential; Future  - Folate; Future  - Vitamin B12; Future  - Iron and TIBC; Future  - Ferritin; Future    4. Anxiety and depression  Will initiate low-dose Seroquel.  Advised to begin at 12.5 mg may increase to 25 mg if tolerated and needed.  - Comprehensive Metabolic Panel; Future  - QUEtiapine (SEROQUEL) 25 MG tablet; Take 0.5-1 tablets by mouth nightly  Dispense: 30 tablet; Refill: 1    5. Scoliosis of thoracic spine, unspecified scoliosis type  Will update imaging to assess hardware.  - XR THORACIC

## 2025-01-31 NOTE — PATIENT INSTRUCTIONS
talk to your doctor about stop-smoking programs and medicines. These can increase your chances of quitting for good. Quitting is one of the most important things you can do to protect your heart. It is never too late to quit. Try to avoid secondhand smoke too.     Stay at a weight that's healthy for you. Talk to your doctor if you need help losing weight.     Try to get 7 to 9 hours of sleep each night.     Limit alcohol to 2 drinks a day for men and 1 drink a day for women. Too much alcohol can cause health problems.     Manage other health problems such as diabetes, high blood pressure, and high cholesterol. If you think you may have a problem with alcohol or drug use, talk to your doctor.   Medicines    Take your medicines exactly as prescribed. Call your doctor if you think you are having a problem with your medicine.     If your doctor recommends aspirin, take the amount directed each day. Make sure you take aspirin and not another kind of pain reliever, such as acetaminophen (Tylenol).   When should you call for help?   Call 911 if you have symptoms of a heart attack. These may include:    Chest pain or pressure, or a strange feeling in the chest.     Sweating.     Shortness of breath.     Pain, pressure, or a strange feeling in the back, neck, jaw, or upper belly or in one or both shoulders or arms.     Lightheadedness or sudden weakness.     A fast or irregular heartbeat.   After you call 911, the  may tell you to chew 1 adult-strength or 2 to 4 low-dose aspirin. Wait for an ambulance. Do not try to drive yourself.  Watch closely for changes in your health, and be sure to contact your doctor if you have any problems.  Where can you learn more?  Go to https://www.[employer].net/patientEd and enter F075 to learn more about \"A Healthy Heart: Care Instructions.\"  Current as of: July 31, 2024  Content Version: 14.3  © 2024 [employer].   Care instructions adapted under license by [employer].

## 2025-02-01 LAB
ALBUMIN SERPL-MCNC: 5 G/DL (ref 3.4–5)
ALBUMIN/GLOB SERPL: 1.9 {RATIO} (ref 1.1–2.2)
ALP SERPL-CCNC: 82 U/L (ref 40–129)
ALT SERPL-CCNC: 32 U/L (ref 10–40)
ANION GAP SERPL CALCULATED.3IONS-SCNC: 10 MMOL/L (ref 3–16)
AST SERPL-CCNC: 28 U/L (ref 15–37)
BASOPHILS # BLD: 0 K/UL (ref 0–0.2)
BASOPHILS NFR BLD: 0.8 %
BILIRUB SERPL-MCNC: 0.6 MG/DL (ref 0–1)
BUN SERPL-MCNC: 10 MG/DL (ref 7–20)
CALCIUM SERPL-MCNC: 10.2 MG/DL (ref 8.3–10.6)
CHLORIDE SERPL-SCNC: 101 MMOL/L (ref 99–110)
CHOLEST SERPL-MCNC: 163 MG/DL (ref 0–199)
CO2 SERPL-SCNC: 30 MMOL/L (ref 21–32)
CREAT SERPL-MCNC: 0.6 MG/DL (ref 0.6–1.1)
DEPRECATED RDW RBC AUTO: 12.5 % (ref 12.4–15.4)
EOSINOPHIL # BLD: 0.3 K/UL (ref 0–0.6)
EOSINOPHIL NFR BLD: 4.9 %
FERRITIN SERPL IA-MCNC: 67.1 NG/ML (ref 15–150)
FOLATE SERPL-MCNC: 32.2 NG/ML (ref 4.78–24.2)
GFR SERPLBLD CREATININE-BSD FMLA CKD-EPI: >90 ML/MIN/{1.73_M2}
GLUCOSE SERPL-MCNC: 93 MG/DL (ref 70–99)
HCT VFR BLD AUTO: 46.4 % (ref 36–48)
HDLC SERPL-MCNC: 55 MG/DL (ref 40–60)
HGB BLD-MCNC: 15.9 G/DL (ref 12–16)
IRON SATN MFR SERPL: 26 % (ref 15–50)
IRON SERPL-MCNC: 90 UG/DL (ref 37–145)
LDL CHOLESTEROL: 96 MG/DL
LYMPHOCYTES # BLD: 1.4 K/UL (ref 1–5.1)
LYMPHOCYTES NFR BLD: 22.7 %
MAGNESIUM SERPL-MCNC: 1.98 MG/DL (ref 1.8–2.4)
MCH RBC QN AUTO: 33.3 PG (ref 26–34)
MCHC RBC AUTO-ENTMCNC: 34.1 G/DL (ref 31–36)
MCV RBC AUTO: 97.5 FL (ref 80–100)
MONOCYTES # BLD: 0.5 K/UL (ref 0–1.3)
MONOCYTES NFR BLD: 7.5 %
NEUTROPHILS # BLD: 3.9 K/UL (ref 1.7–7.7)
NEUTROPHILS NFR BLD: 64.1 %
PLATELET # BLD AUTO: 170 K/UL (ref 135–450)
PMV BLD AUTO: 10.6 FL (ref 5–10.5)
POTASSIUM SERPL-SCNC: 4 MMOL/L (ref 3.5–5.1)
PROT SERPL-MCNC: 7.6 G/DL (ref 6.4–8.2)
RBC # BLD AUTO: 4.76 M/UL (ref 4–5.2)
SODIUM SERPL-SCNC: 141 MMOL/L (ref 136–145)
TIBC SERPL-MCNC: 342 UG/DL (ref 260–445)
TRIGL SERPL-MCNC: 61 MG/DL (ref 0–150)
TSH SERPL DL<=0.005 MIU/L-ACNC: 1.4 UIU/ML (ref 0.27–4.2)
VIT B12 SERPL-MCNC: 2333 PG/ML (ref 211–911)
VLDLC SERPL CALC-MCNC: 12 MG/DL
WBC # BLD AUTO: 6 K/UL (ref 4–11)

## 2025-02-03 ENCOUNTER — CARE COORDINATION (OUTPATIENT)
Dept: CARE COORDINATION | Age: 45
End: 2025-02-03

## 2025-02-03 NOTE — CARE COORDINATION
Received referral from PCP on 1/31/25 requesting assistance with identifying dental provider. Pt saw dentist in [place] who stated they need 1200 prior to completing dental work.     Attempted phone call to Pt to discuss other possible providers for dental care. No answer, vm box is not set up, not able to leave vm message.     SW plan of care: SW will make next outreach attempt on 2/6 to follow up regarding dental care.  Pt does not have [employer] set up, not able to leave message via [place].

## 2025-02-04 NOTE — PROGRESS NOTES
Patient Name: Justina Jaramillo  : 1980  MRN# [id number]    REASON FOR VISIT:  CONSULT  Patient Active Problem List    Diagnosis Date Noted    Vulvar lump 2022    Vulvar pain 2022    Anxiety and depression 10/27/2022    Cigarette smoker 10/27/2022    Allergic rhinitis due to fungal spores 10/27/2022    Arrhythmia     SOB (shortness of breath)     Back injury     GERD (gastroesophageal reflux disease)     Bradycardia     AV block, 2nd degree      CURRENT SX:     Chest Pain :    When did it begin:    How long does it last:    How severe 1-10:    Radiation:    Aggravating factors:    Relieving factors:    Associated features:    Tenderness to palp:    Shortness of Breath:    When did it start:    How many flights of stairs can they climb without SOB:    Associated features:    Orthopena; how many pillows do they sleep with under your head :    Dizziness    Palpitations:    When did it begin:    How often do they occur:    How long do they last:    Aggravating factors:    Relieving factors:    Associated features:    Any thyroid issues:    How much caffeine:    Edema    Do you Exercise??    LABS:  Lab Results   Component Value Date    HDL 55 2025   No results found for: \"LABA1C\", \"[id number]\"

## 2025-02-06 ENCOUNTER — CARE COORDINATION (OUTPATIENT)
Dept: CARE COORDINATION | Age: 45
End: 2025-02-06

## 2025-02-06 NOTE — CARE COORDINATION
Received referral from PCP on 1/31/25 stating Pt needs dental work and needs to pay 1200 up front prior to having work completed, but cannot afford to pay that.     Attempted phone call to Pt to discuss. VM is not set up, not able to leave vm message. Pt does not have mychart.     SW will send letter to Pt with this SW contact information and options for dental providers   [facility]    BILLY provided dental customer service number from Pt insurance card    Secure email sent to Mary PRINCE requesting she mails out letter to Pt. Will be mailed out on 2/7.     BILLY plan of care: SW will make next outreach attempt on 2/24 to follow up regarding dental.

## 2025-02-18 ENCOUNTER — TELEPHONE (OUTPATIENT)
Dept: CARDIOLOGY CLINIC | Age: 45
End: 2025-02-18

## 2025-02-18 NOTE — TELEPHONE ENCOUNTER
Called pt to get scheduled. We received a referral. NVM. If pt calls back schedule first available for AV Block 2nd degree.

## 2025-02-24 ENCOUNTER — CARE COORDINATION (OUTPATIENT)
Dept: CARE COORDINATION | Age: 45
End: 2025-02-24

## 2025-02-24 NOTE — CARE COORDINATION
Attempted phone call to Pt to discuss dental. No answer, vm box is not set up, not able to leave vm message. SW did send letter to Pt via postal mail to provide information on dental providers.    BILLY plan of care: SW will resolve from  services, routed update to PCP.

## 2025-03-07 ENCOUNTER — OFFICE VISIT (OUTPATIENT)
Dept: FAMILY MEDICINE CLINIC | Age: 45
End: 2025-03-07
Payer: MEDICARE

## 2025-03-07 VITALS
HEIGHT: 68 IN | DIASTOLIC BLOOD PRESSURE: 58 MMHG | BODY MASS INDEX: 16.67 KG/M2 | WEIGHT: 110 LBS | SYSTOLIC BLOOD PRESSURE: 94 MMHG | OXYGEN SATURATION: 96 % | HEART RATE: 85 BPM

## 2025-03-07 DIAGNOSIS — R63.6 UNDERWEIGHT (BMI < 18.5): ICD-10-CM

## 2025-03-07 DIAGNOSIS — R06.02 SOB (SHORTNESS OF BREATH): ICD-10-CM

## 2025-03-07 DIAGNOSIS — F41.9 ANXIETY AND DEPRESSION: Primary | ICD-10-CM

## 2025-03-07 DIAGNOSIS — F32.A ANXIETY AND DEPRESSION: Primary | ICD-10-CM

## 2025-03-07 DIAGNOSIS — I44.1 AV BLOCK, 2ND DEGREE: ICD-10-CM

## 2025-03-07 PROCEDURE — 99214 OFFICE O/P EST MOD 30 MIN: CPT

## 2025-03-07 RX ORDER — METHYLPREDNISOLONE 4 MG/1
TABLET ORAL
Qty: 1 KIT | Refills: 0 | Status: SHIPPED | OUTPATIENT
Start: 2025-03-07

## 2025-03-07 RX ORDER — BUPROPION HYDROCHLORIDE 150 MG/1
150 TABLET ORAL EVERY MORNING
Qty: 30 TABLET | Refills: 2 | Status: SHIPPED | OUTPATIENT
Start: 2025-03-07

## 2025-03-07 RX ORDER — ALBUTEROL SULFATE 0.83 MG/ML
2.5 SOLUTION RESPIRATORY (INHALATION) EVERY 6 HOURS PRN
Qty: 120 EACH | Refills: 0 | Status: SHIPPED | OUTPATIENT
Start: 2025-03-07

## 2025-03-07 ASSESSMENT — ENCOUNTER SYMPTOMS
CONSTIPATION: 0
WHEEZING: 0
BLOOD IN STOOL: 0
NAUSEA: 0
ABDOMINAL PAIN: 0
VOMITING: 0
DIARRHEA: 0
SHORTNESS OF BREATH: 0
ABDOMINAL DISTENTION: 0
COLOR CHANGE: 0

## 2025-03-07 NOTE — PROGRESS NOTES
3/7/2025    Justina Jaramillo    Chief Complaint   Patient presents with    1 Month Follow-Up     Headache today and sleepy    Medication Check     New medication make her sleepy. Serequel    Discuss Labs     Cut out vitamins due to last labs and having goldie horses now.     Forms     SS Disability       HPI  History was obtained from patient.  Justina is a pleasant 44 y.o. female who presents today for 4 week follow up.     Started on Seroquel at last visit for anxiety and depression. 25 mg tablets are just too much. Even now that she has returned to the 12.5 mg tablets she continues to have sleepiness. She notes that she isn't crying all the time or sleeping as much a she was before.     Of note she did get new glasses and they advised that she may have a headache with this.     1. Anxiety and depression    2. SOB (shortness of breath)    3. AV block, 2nd degree    4. Underweight (BMI < 18.5)         REVIEW OF SYMPTOMS    Review of Systems   Constitutional:  Positive for fatigue. Negative for activity change, appetite change, chills, fever and unexpected weight change.   Respiratory:  Negative for shortness of breath and wheezing.    Cardiovascular:  Negative for chest pain and palpitations.   Gastrointestinal:  Negative for abdominal distention, abdominal pain, blood in stool, constipation, diarrhea, nausea and vomiting.   Skin:  Negative for color change.   Neurological:  Positive for headaches (today). Negative for syncope.   Psychiatric/Behavioral:  Positive for dysphoric mood and sleep disturbance (improving). The patient is nervous/anxious.        No data recorded    The 10-year ASCVD risk score (Thuy DK, et al., 2019) is: 0.9%    Values used to calculate the score:      Age: 44 years      Sex: Female      Is Non- : No      Diabetic: No      Tobacco smoker: Yes      Systolic Blood Pressure: 94 mmHg      Is BP treated: No      HDL Cholesterol: 55 mg/dL      Total Cholesterol: 163

## 2025-03-17 ENCOUNTER — OFFICE VISIT (OUTPATIENT)
Dept: OBGYN | Age: 45
End: 2025-03-17
Payer: MEDICARE

## 2025-03-17 VITALS
SYSTOLIC BLOOD PRESSURE: 124 MMHG | HEIGHT: 69 IN | BODY MASS INDEX: 16.06 KG/M2 | HEART RATE: 72 BPM | DIASTOLIC BLOOD PRESSURE: 74 MMHG | WEIGHT: 108.4 LBS

## 2025-03-17 DIAGNOSIS — N76.6 VULVAR ULCER: Primary | ICD-10-CM

## 2025-03-17 DIAGNOSIS — Z20.2 STD EXPOSURE: ICD-10-CM

## 2025-03-17 DIAGNOSIS — R53.83 FATIGUE, UNSPECIFIED TYPE: ICD-10-CM

## 2025-03-17 PROCEDURE — 99214 OFFICE O/P EST MOD 30 MIN: CPT | Performed by: OBSTETRICS & GYNECOLOGY

## 2025-03-17 PROCEDURE — 36415 COLL VENOUS BLD VENIPUNCTURE: CPT | Performed by: OBSTETRICS & GYNECOLOGY

## 2025-03-17 RX ORDER — VALACYCLOVIR HYDROCHLORIDE 1 G/1
1000 TABLET, FILM COATED ORAL DAILY
Qty: 30 TABLET | Refills: 11 | Status: SHIPPED | OUTPATIENT
Start: 2025-03-17

## 2025-03-17 NOTE — PROGRESS NOTES
3/17/25    Justina Jaramillo  1980    Chief Complaint   Patient presents with    Other     Pt c/o vulvar lesions and vulvat pain x 1 wk.         Justina Jaramillo is a 44 y.o. female who presents today for evaluation of recurrent vulvar ulcers and fatigue    Past Medical History:   Diagnosis Date    Anxiety     Arrhythmia     AV block, 2nd degree     mobitz type 1    Back injury     Bradycardia     Depression     Drug abuse (HCC)     Gastroparesis     GERD (gastroesophageal reflux disease)     H/O 24 hour EKG monitoring 06/01/2011    baseline rhythm is sinus amina  average 50bpm , episodes of wenchebach phenomenon with brief runs of sinus tachycardia.     H/O complete electrocardiogram 06/23/2011         Hx of echocardiogram 06/23/2011    normal size lv trace tricuspid regurg otherwise normal .    Scoliosis     SOB (shortness of breath)     Vulvar lesion     Vulvar lump     Vulvar pain        Past Surgical History:   Procedure Laterality Date    APPENDECTOMY      BACK SURGERY      scoliosis    BREAST LUMPECTOMY      R breast    CHOLECYSTECTOMY      HYSTERECTOMY (CERVIX STATUS UNKNOWN)  11/10     OVARY REMOVAL  11/10    TONSILLECTOMY      TUBAL LIGATION         Social History     Tobacco Use    Smoking status: Every Day     Current packs/day: 0.50     Average packs/day: 0.5 packs/day for 33.2 years (16.6 ttl pk-yrs)     Types: Cigarettes     Start date: 1992    Smokeless tobacco: Never    Tobacco comments:     counseled to quit smoking.2/5/2013   Vaping Use    Vaping status: Never Used   Substance Use Topics    Alcohol use: No    Drug use: Yes     Types: Marijuana (Weed)     Comment: history addiction percocet       Family History   Problem Relation Age of Onset    Stroke Mother     High Cholesterol Mother     Mult Sclerosis Mother     Bleeding Prob Brother     Crohn's Disease Sister        Current Outpatient Medications   Medication Sig Dispense Refill    valACYclovir (VALTREX) 1 g tablet Take 1 tablet by mouth

## 2025-03-18 ENCOUNTER — RESULTS FOLLOW-UP (OUTPATIENT)
Dept: OBGYN | Age: 45
End: 2025-03-18

## 2025-03-18 LAB
BASOPHILS # BLD: 0.1 K/UL (ref 0–0.2)
BASOPHILS NFR BLD: 0.8 %
DEPRECATED RDW RBC AUTO: 12.8 % (ref 12.4–15.4)
EOSINOPHIL # BLD: 0.3 K/UL (ref 0–0.6)
EOSINOPHIL NFR BLD: 4.8 %
HCT VFR BLD AUTO: 43.8 % (ref 36–48)
HERPES SIMPLEX VIRUS 1 IGG: POSITIVE
HERPES SIMPLEX VIRUS 2 IGG: POSITIVE
HGB BLD-MCNC: 15.3 G/DL (ref 12–16)
HIV 1+2 AB+HIV1 P24 AG SERPL QL IA: NORMAL
HIV 2 AB SERPL QL IA: NORMAL
HIV1 AB SERPL QL IA: NORMAL
HIV1 P24 AG SERPL QL IA: NORMAL
LYMPHOCYTES # BLD: 2.1 K/UL (ref 1–5.1)
LYMPHOCYTES NFR BLD: 30.7 %
MCH RBC QN AUTO: 34.5 PG (ref 26–34)
MCHC RBC AUTO-ENTMCNC: 35 G/DL (ref 31–36)
MCV RBC AUTO: 98.5 FL (ref 80–100)
MONOCYTES # BLD: 0.7 K/UL (ref 0–1.3)
MONOCYTES NFR BLD: 10.6 %
NEUTROPHILS # BLD: 3.6 K/UL (ref 1.7–7.7)
NEUTROPHILS NFR BLD: 53.1 %
PLATELET # BLD AUTO: 209 K/UL (ref 135–450)
PMV BLD AUTO: 8.9 FL (ref 5–10.5)
RBC # BLD AUTO: 4.44 M/UL (ref 4–5.2)
REAGIN+T PALLIDUM IGG+IGM SERPL-IMP: NORMAL
TSH SERPL DL<=0.005 MIU/L-ACNC: 1.9 UIU/ML (ref 0.27–4.2)
WBC # BLD AUTO: 6.7 K/UL (ref 4–11)

## 2025-03-20 LAB
FINAL REPORT: NORMAL
PRELIMINARY: NORMAL

## 2025-03-20 NOTE — PROGRESS NOTES
Patient Name: Justina Jaramillo  : 1980  MRN# [id number]    REASON FOR VISIT:  CONSULT  Patient Active Problem List    Diagnosis Date Noted    Vulvar lump 2022    Vulvar pain 2022    Anxiety and depression 10/27/2022    Cigarette smoker 10/27/2022    Allergic rhinitis due to fungal spores 10/27/2022    Arrhythmia     SOB (shortness of breath)     Back injury     GERD (gastroesophageal reflux disease)     Bradycardia     AV block, 2nd degree          LABS:  Lab Results   Component Value Date    HDL 55 2025    TSH 1.90 2025   No results found for: \"LABA1C\", \"[id number]\"

## 2025-03-25 ENCOUNTER — INITIAL CONSULT (OUTPATIENT)
Dept: CARDIOLOGY CLINIC | Age: 45
End: 2025-03-25
Payer: MEDICARE

## 2025-03-25 VITALS
HEIGHT: 68 IN | SYSTOLIC BLOOD PRESSURE: 116 MMHG | WEIGHT: 108 LBS | HEART RATE: 74 BPM | BODY MASS INDEX: 16.37 KG/M2 | DIASTOLIC BLOOD PRESSURE: 80 MMHG

## 2025-03-25 DIAGNOSIS — F17.210 CIGARETTE SMOKER: ICD-10-CM

## 2025-03-25 DIAGNOSIS — R00.1 BRADYCARDIA: ICD-10-CM

## 2025-03-25 DIAGNOSIS — R07.9 CHEST PAIN, UNSPECIFIED TYPE: Primary | ICD-10-CM

## 2025-03-25 DIAGNOSIS — I49.8 OTHER CARDIAC ARRHYTHMIA: ICD-10-CM

## 2025-03-25 DIAGNOSIS — I44.1 AV BLOCK, 2ND DEGREE: ICD-10-CM

## 2025-03-25 PROCEDURE — 93000 ELECTROCARDIOGRAM COMPLETE: CPT | Performed by: INTERNAL MEDICINE

## 2025-03-25 PROCEDURE — 99204 OFFICE O/P NEW MOD 45 MIN: CPT | Performed by: INTERNAL MEDICINE

## 2025-03-25 NOTE — PATIENT INSTRUCTIONS
Thank you for allowing us to care for you today!   We want to ensure we can follow your treatment plan and we strive to give you the best outcomes and experience possible.   If you ever have a life threatening emergency and call 911 - for an ambulance (EMS)  REMEMBER  Our providers can only care for you at:   [facility] or [facility]   Even if you have someone take you or you drive yourself we can only care for you in a [facility] facility. Our providers are not setup at the other healthcare locations!    PLEASE CALL OUR OFFICE DURING NORMAL BUSINESS HOURS  Monday through Friday 8 am to 5 pm  AFTER HOURS the physician on-call cannot help with scheduling, rescheduling, procedure instruction questions or any type of medication need or issue.  [facility] P:[phone] - [facility] P:[phone] - [facility] P:[phone]      If you receive a survey:  We would appreciate you taking the time to share your experience concerning your provider visit in the office.    These surveys are confidential!  We are eager to improve and are counting on you to share your feedback so we can ensure you get the best care possible.    CHEST PAIN with Family history of premature coronary artery disease: Apparently patient's symptoms are more related to her anxiety state but she has significant tobacco abuse history in addition to premature family history of coronary artery disease signs I will subject her to stress Cardiolite perfusion imaging and echocardiographic studies for further risk stratification and make recommendations based on test results.    Palpitations: Patient admits to consuming significant amount of Mountain Dew daily but she is in the process of substituting it with regular water and says symptoms are probably getting a little better.  Hence I will wait to see once have monitoring due to consumption and is reduced and if her palpitations get better

## 2025-03-25 NOTE — PROGRESS NOTES
.CLINICAL STAFF DOCUMENTATION    Dr. Larry Jaramillo  1980  [id number]    Have you had any Chest Pain recently? - Yes, past 6-7 months   If Yes DO EKG   What type of pain is it? - Sharp Pain and pressure   Tender to palpate (touch)? No  When did the pain begin? - 6-7 Months   How long does the pain last? - differs between pain and pressure on how long it lasts. Sometimes all day     How Severe is the pain? - 8 if pressure, 6 or 7 if pain   Is there anything that aggravates or triggers the pain? Exertion, and anxiety   Did you take any medication? Pain Medications   And did it relieve the pain - Ibuprofen helps a little   Is there anything that relieves it?  Laying down with hand on chest and calming down, relieves it   Do you have any other symptoms at the same time? Nausea and Hot flashes     Have you had any Shortness of Breath - Yes,, smoker   When did it begin? - Years     Have you had any dizziness - No    Have you had any palpitations recently? - Yes  If Yes DO EKG - Do you feel your heart fluttering  When did they begin? - 1 Months   How long do they last - . 15-20 minutes depending on situation   Is there anything that aggravates or triggers them?  Anxiety   Is there anything that relieves them? Lying down and resting, and smoking marijuana      Any thyroid issues? - No    Do you have any edema - swelling in No  swelling. But tired and aching legs      When did you have your last labs drawn 3/17/2025  What doctor ordered [facility] & Aaliyah   Do we have the labs in their chart Yes    Do you have a surgery or procedure scheduled in the near future - No    Do use tobacco products? - Yes, about 2 ppds   Do you drink alcohol? - No  Do you use any illicit drugs? - Yes, marijuana  1 oz per week   Caffeine? - Yes, 15-16 mtn dew per day       Check medication list thoroughly!!! AND RECONCILE OUTSIDE MEDICATIONS  If dose has changed change the entire order not just the MG  BE SURE TO ASK

## 2025-03-25 NOTE — PROGRESS NOTES
CARDIAC CONSULT NOTE       Justina  44 y.o.  female    Chief Complaint   Patient presents with    Consultation     Consult from Sis Fischer. Patient states sister passed from heart attack at age 52. Father had triple bypass. Patient is a prior drug addict         Referring physician:  Sis Fischer APRN - CNP     Primary care physician:  Sis Fischer APRN - CNP    History of Present Illness:     Justina is a 44 y.o. female referred for evaluation and management of chest pain complaints.  In addition patient has premature coronary artery disease history in the family.   Patient states sister passed from heart attack at age 52 and passed away.  Patient used to see me some 12 years ago.  Patient is now complaining of chest pains, some shortness of breath and palpitations.  CHEST PAIN:  When did it began: 6 or 7 months ago, almost on a daily basis.  How long does it last: Can be all day long it depends on her anxiety status.  How severe: If it is pressure-like symptom it is about 8 out of 10 in intensity if it is chest pain then 6-7 out of 10 in intensity.  Radiation: None  Aggravating factors: Anxiety and at times with exertion  Relieving factors: Calming herself down.  Associated features: Nausea and hot flashes.  Tenderness to palpation: No  Other than family family history of premature coronary artery disease patient does not have any known history of hypertension, diabetes and dyslipidemia problems of course she is a smoker.    As to her palpitations are concerned patient consumes excessive amount of caffeine.  Reportedly drinks about 15 to 16 cans of Mountain Dew but no other energy drinks, tea or coffee.     has a past medical history of Anxiety, Arrhythmia, AV block, 2nd degree, Back injury, Bradycardia, Depression, Drug abuse (HCC), Gastroparesis, GERD (gastroesophageal reflux disease), H/O 24 hour EKG monitoring, H/O complete electrocardiogram, Hx of

## 2025-03-25 NOTE — PROGRESS NOTES
CLINICAL STAFF DOCUMENTATION    Dr. Larry Jaramillo  1980  [id number]    Have you had any Chest Pain recently? - Yes  If Yes DO EKG   What type of pain is it? -  Pressure, stabbing, and zapping feeling     Tender to palpate (touch)? No  When did the pain begin? - 6-7 Months, past 3 months a lot of stress   How long does the pain last? - sometimes feels pressure all day, depends on anxiety and stress around her    How Severe is the pain? - 8 when its the pressure, sharp pain then 6 or 7   Is there anything that aggravates or triggers the pain? Exertion  Did you take any medication? Pain Medications   And did it relieve the pain - Not really   Is there anything that relieves it?  Grabbing chest   Do you have any other symptoms at the same time? Nausea, and super hot     Have you had any Shortness of Breath - Yes  When did it begin? -  a while, smoker       Have you had any dizziness - No    Have you had any palpitations recently? - Yes  If Yes DO EKG - Do you feel your heart pounding  When did they begin? - 1 Months   How long do they last - .15-20 minutes depending on situation   Is there anything that aggravates or triggers them?  Anxiety    Is there anything that relieves them?  Once calming down, smokes marijuana     Any thyroid issues? - No    Do you have any edema - swelling in No      When did you have your last labs drawn 3/17/2025  What doctor ordered Osterholt & Aaliyah   Do we have the labs in their chart Yes    Do you have a surgery or procedure scheduled in the near future - No    Do use tobacco products? - Yes, almost 2 ppd   Do you drink alcohol? - No  Do you use any illicit drugs? - Yes, marijuana about 1 oz per day   Caffeine? - Yes, heavy mtn dew drinker, 15 or 16 pd     Check medication list thoroughly!!! AND RECONCILE OUTSIDE MEDICATIONS  If dose has changed change the entire order not just the MG  BE SURE TO ASK PATIENT IF THEY NEED MEDICATION REFILLS  Verify Pharmacy and

## 2025-03-28 ENCOUNTER — TELEPHONE (OUTPATIENT)
Dept: CARDIOLOGY CLINIC | Age: 45
End: 2025-03-28

## 2025-04-04 ENCOUNTER — OFFICE VISIT (OUTPATIENT)
Dept: FAMILY MEDICINE CLINIC | Age: 45
End: 2025-04-04
Payer: MEDICARE

## 2025-04-04 VITALS
BODY MASS INDEX: 16.55 KG/M2 | DIASTOLIC BLOOD PRESSURE: 56 MMHG | WEIGHT: 109.2 LBS | HEIGHT: 68 IN | HEART RATE: 73 BPM | OXYGEN SATURATION: 98 % | SYSTOLIC BLOOD PRESSURE: 90 MMHG

## 2025-04-04 DIAGNOSIS — R63.6 UNDERWEIGHT (BMI < 18.5): ICD-10-CM

## 2025-04-04 DIAGNOSIS — I44.1 AV BLOCK, 2ND DEGREE: ICD-10-CM

## 2025-04-04 DIAGNOSIS — F41.9 ANXIETY AND DEPRESSION: Primary | ICD-10-CM

## 2025-04-04 DIAGNOSIS — J44.9 CHRONIC OBSTRUCTIVE PULMONARY DISEASE, UNSPECIFIED COPD TYPE (HCC): ICD-10-CM

## 2025-04-04 DIAGNOSIS — F17.210 CIGARETTE SMOKER: ICD-10-CM

## 2025-04-04 DIAGNOSIS — F32.A ANXIETY AND DEPRESSION: Primary | ICD-10-CM

## 2025-04-04 DIAGNOSIS — F11.11 HISTORY OF OPIOID ABUSE: ICD-10-CM

## 2025-04-04 DIAGNOSIS — Z87.09 HISTORY OF ASTHMA: ICD-10-CM

## 2025-04-04 PROCEDURE — 99214 OFFICE O/P EST MOD 30 MIN: CPT

## 2025-04-04 RX ORDER — TIOTROPIUM BROMIDE INHALATION SPRAY 1.56 UG/1
2 SPRAY, METERED RESPIRATORY (INHALATION) DAILY
Qty: 1 EACH | Refills: 3 | Status: SHIPPED | OUTPATIENT
Start: 2025-04-04

## 2025-04-04 RX ORDER — METHYLPREDNISOLONE 4 MG/1
TABLET ORAL
Qty: 1 KIT | Refills: 0 | Status: SHIPPED | OUTPATIENT
Start: 2025-04-04

## 2025-04-04 RX ORDER — BUDESONIDE AND FORMOTEROL FUMARATE DIHYDRATE 80; 4.5 UG/1; UG/1
2 AEROSOL RESPIRATORY (INHALATION) 2 TIMES DAILY
Qty: 1 EACH | Refills: 5 | Status: SHIPPED | OUTPATIENT
Start: 2025-04-04

## 2025-04-04 NOTE — PROGRESS NOTES
pulmonary disease, unspecified COPD type (HCC)  Will trial Spiriva and repeat steroid.   - Spacer/Aero-Holding Chambers ABDOULAYE; 1 Device by Does not apply route daily as needed (inhaler)  Dispense: 1 each; Refill: 0  - methylPREDNISolone (MEDROL DOSEPACK) 4 MG tablet; Take by mouth.  Dispense: 1 kit; Refill: 0  - tiotropium (SPIRIVA RESPIMAT) 1.25 MCG/ACT AERS inhaler; Inhale 2 puffs into the lungs daily  Dispense: 1 each; Refill: 3    7. History of asthma  Will trial Symbicort and repeat steroid.   - budesonide-formoterol (SYMBICORT) 80-4.5 MCG/ACT AERO; Inhale 2 puffs into the lungs 2 times daily  Dispense: 1 each; Refill: 5  - Spacer/Aero-Holding Chambers ABDOULAYE; 1 Device by Does not apply route daily as needed (inhaler)  Dispense: 1 each; Refill: 0  - methylPREDNISolone (MEDROL DOSEPACK) 4 MG tablet; Take by mouth.  Dispense: 1 kit; Refill: 0      Return in about 6 weeks (around 5/16/2025).          Plan of care reviewed with patient as described above, all questions answered at this time. Patient agreeable with plan of care.       Electronically signed by CAROLE Muñoz CNP on 4/4/2025

## 2025-04-09 ASSESSMENT — ENCOUNTER SYMPTOMS: RESPIRATORY NEGATIVE: 1

## 2025-04-21 ENCOUNTER — HOSPITAL ENCOUNTER (OUTPATIENT)
Dept: GENERAL RADIOLOGY | Age: 45
Discharge: HOME | End: 2025-04-21
Payer: MEDICARE

## 2025-04-21 ENCOUNTER — TELEPHONE (OUTPATIENT)
Dept: FAMILY MEDICINE CLINIC | Age: 45
End: 2025-04-21

## 2025-04-21 DIAGNOSIS — M41.9 SCOLIOSIS OF THORACIC SPINE, UNSPECIFIED SCOLIOSIS TYPE: ICD-10-CM

## 2025-04-21 DIAGNOSIS — M41.9 SCOLIOSIS OF LUMBAR SPINE, UNSPECIFIED SCOLIOSIS TYPE: ICD-10-CM

## 2025-04-21 PROCEDURE — 72070 X-RAY EXAM THORAC SPINE 2VWS: CPT

## 2025-04-21 PROCEDURE — 72072 X-RAY EXAM THORAC SPINE 3VWS: CPT

## 2025-04-21 NOTE — TELEPHONE ENCOUNTER
Sis Fischer, APRN - CNP to Charisse Hull      4/21/25  2:02 PM   Need to evaluate hardware. No specific views as long as hardware can be properly seen    Spoke with imaging center.  They are going to do AP/LAT on both XR orders and that should show all hardware.

## 2025-04-21 NOTE — TELEPHONE ENCOUNTER
XR LUMBAR 4 VIEWS-NEED TO KNOW WHICH VIEWS? THIS # IS HER CELL IF SHE DOES NOT ANSWER YOU CAN TEXT HER

## 2025-04-23 ENCOUNTER — COMMUNITY OUTREACH (OUTPATIENT)
Dept: FAMILY MEDICINE CLINIC | Age: 45
End: 2025-04-23

## 2025-04-24 ENCOUNTER — RESULTS FOLLOW-UP (OUTPATIENT)
Dept: FAMILY MEDICINE CLINIC | Age: 45
End: 2025-04-24

## 2025-05-05 DIAGNOSIS — F41.9 ANXIETY AND DEPRESSION: ICD-10-CM

## 2025-05-05 DIAGNOSIS — F32.A ANXIETY AND DEPRESSION: ICD-10-CM

## 2025-05-08 RX ORDER — QUETIAPINE FUMARATE 25 MG/1
TABLET, FILM COATED ORAL
Qty: 30 TABLET | Refills: 1 | Status: SHIPPED | OUTPATIENT
Start: 2025-05-08

## 2025-05-16 ENCOUNTER — OFFICE VISIT (OUTPATIENT)
Dept: FAMILY MEDICINE CLINIC | Age: 45
End: 2025-05-16
Payer: MEDICARE

## 2025-05-16 VITALS
DIASTOLIC BLOOD PRESSURE: 58 MMHG | BODY MASS INDEX: 16.88 KG/M2 | HEIGHT: 68 IN | HEART RATE: 85 BPM | SYSTOLIC BLOOD PRESSURE: 98 MMHG | OXYGEN SATURATION: 96 % | WEIGHT: 111.4 LBS

## 2025-05-16 DIAGNOSIS — J44.9 CHRONIC OBSTRUCTIVE PULMONARY DISEASE, UNSPECIFIED COPD TYPE (HCC): ICD-10-CM

## 2025-05-16 DIAGNOSIS — K08.89 DENTALGIA: ICD-10-CM

## 2025-05-16 DIAGNOSIS — F32.A ANXIETY AND DEPRESSION: Primary | ICD-10-CM

## 2025-05-16 DIAGNOSIS — F41.9 ANXIETY AND DEPRESSION: Primary | ICD-10-CM

## 2025-05-16 DIAGNOSIS — R63.6 UNDERWEIGHT (BMI < 18.5): ICD-10-CM

## 2025-05-16 PROCEDURE — 99214 OFFICE O/P EST MOD 30 MIN: CPT

## 2025-05-16 RX ORDER — QUETIAPINE FUMARATE 25 MG/1
25-50 TABLET, FILM COATED ORAL NIGHTLY
Qty: 60 TABLET | Refills: 2 | Status: SHIPPED | OUTPATIENT
Start: 2025-05-16
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
